# Patient Record
Sex: MALE | Race: WHITE | NOT HISPANIC OR LATINO | Employment: FULL TIME | URBAN - NONMETROPOLITAN AREA
[De-identification: names, ages, dates, MRNs, and addresses within clinical notes are randomized per-mention and may not be internally consistent; named-entity substitution may affect disease eponyms.]

---

## 2017-01-06 ENCOUNTER — HOSPITAL ENCOUNTER (INPATIENT)
Facility: HOSPITAL | Age: 22
LOS: 3 days | Discharge: HOME | End: 2017-01-09

## 2017-01-06 ENCOUNTER — HOSPITAL ENCOUNTER (EMERGENCY)
Facility: HOSPITAL | Age: 22
Discharge: PSYCHIATRIC OR SUBSTANCE USE FACILITY | End: 2017-01-06
Attending: EMERGENCY MEDICINE | Admitting: EMERGENCY MEDICINE

## 2017-01-06 VITALS
OXYGEN SATURATION: 99 % | DIASTOLIC BLOOD PRESSURE: 82 MMHG | HEIGHT: 68 IN | BODY MASS INDEX: 20.46 KG/M2 | RESPIRATION RATE: 16 BRPM | TEMPERATURE: 98.3 F | WEIGHT: 135 LBS | SYSTOLIC BLOOD PRESSURE: 146 MMHG | HEART RATE: 71 BPM

## 2017-01-06 DIAGNOSIS — F32.2 MDD (MAJOR DEPRESSIVE EPISODE), SINGLE EPISODE, SEVERE, NO PSYCHOSIS (HCC): Primary | ICD-10-CM

## 2017-01-06 DIAGNOSIS — R45.851 DEPRESSION WITH SUICIDAL IDEATION: Primary | ICD-10-CM

## 2017-01-06 DIAGNOSIS — F32.A DEPRESSION WITH SUICIDAL IDEATION: Primary | ICD-10-CM

## 2017-01-06 LAB
ALBUMIN SERPL-MCNC: 4.9 G/DL (ref 3.5–5)
ALBUMIN/GLOB SERPL: 1.6 G/DL (ref 1.5–2.5)
ALP SERPL-CCNC: 72 U/L (ref 46–116)
ALT SERPL W P-5'-P-CCNC: 21 U/L (ref 10–44)
AMPHET+METHAMPHET UR QL: NEGATIVE
ANION GAP SERPL CALCULATED.3IONS-SCNC: 4.3 MMOL/L (ref 3.6–11.2)
AST SERPL-CCNC: 28 U/L (ref 10–34)
BARBITURATES UR QL SCN: NEGATIVE
BASOPHILS # BLD AUTO: 0.04 10*3/MM3 (ref 0–0.3)
BASOPHILS NFR BLD AUTO: 0.8 % (ref 0–2)
BENZODIAZ UR QL SCN: NEGATIVE
BILIRUB SERPL-MCNC: 0.5 MG/DL (ref 0.2–1.8)
BILIRUB UR QL STRIP: NEGATIVE
BUN BLD-MCNC: 13 MG/DL (ref 7–21)
BUN/CREAT SERPL: 16 (ref 7–25)
BUPRENORPHINE+NOR UR QL SCN: NEGATIVE
CALCIUM SPEC-SCNC: 9.7 MG/DL (ref 7.7–10)
CANNABINOIDS SERPL QL: POSITIVE
CHLORIDE SERPL-SCNC: 103 MMOL/L (ref 99–112)
CLARITY UR: CLEAR
CO2 SERPL-SCNC: 30.7 MMOL/L (ref 24.3–31.9)
COCAINE UR QL: NEGATIVE
COLOR UR: YELLOW
CREAT BLD-MCNC: 0.81 MG/DL (ref 0.43–1.29)
DEPRECATED RDW RBC AUTO: 39.9 FL (ref 37–54)
EOSINOPHIL # BLD AUTO: 0.21 10*3/MM3 (ref 0–0.7)
EOSINOPHIL NFR BLD AUTO: 4 % (ref 0–5)
ERYTHROCYTE [DISTWIDTH] IN BLOOD BY AUTOMATED COUNT: 12.5 % (ref 11.5–14.5)
ETHANOL BLD-MCNC: <10 MG/DL
ETHANOL UR QL: <0.01 %
GFR SERPL CREATININE-BSD FRML MDRD: 118 ML/MIN/1.73
GLOBULIN UR ELPH-MCNC: 3 GM/DL
GLUCOSE BLD-MCNC: 96 MG/DL (ref 70–110)
GLUCOSE UR STRIP-MCNC: NEGATIVE MG/DL
HCT VFR BLD AUTO: 42.3 % (ref 42–52)
HGB BLD-MCNC: 14.6 G/DL (ref 14–18)
HGB UR QL STRIP.AUTO: NEGATIVE
IMM GRANULOCYTES # BLD: 0.01 10*3/MM3 (ref 0–0.03)
IMM GRANULOCYTES NFR BLD: 0.2 % (ref 0–0.5)
KETONES UR QL STRIP: NEGATIVE
LEUKOCYTE ESTERASE UR QL STRIP.AUTO: NEGATIVE
LYMPHOCYTES # BLD AUTO: 1.54 10*3/MM3 (ref 1–3)
LYMPHOCYTES NFR BLD AUTO: 29 % (ref 21–51)
MCH RBC QN AUTO: 30 PG (ref 27–33)
MCHC RBC AUTO-ENTMCNC: 34.5 G/DL (ref 33–37)
MCV RBC AUTO: 86.9 FL (ref 80–94)
METHADONE UR QL SCN: NEGATIVE
MONOCYTES # BLD AUTO: 0.56 10*3/MM3 (ref 0.1–0.9)
MONOCYTES NFR BLD AUTO: 10.5 % (ref 0–10)
NEUTROPHILS # BLD AUTO: 2.95 10*3/MM3 (ref 1.4–6.5)
NEUTROPHILS NFR BLD AUTO: 55.5 % (ref 30–70)
NITRITE UR QL STRIP: NEGATIVE
OPIATES UR QL: NEGATIVE
OSMOLALITY SERPL CALC.SUM OF ELEC: 275.7 MOSM/KG (ref 273–305)
OXYCODONE UR QL SCN: NEGATIVE
PCP UR QL SCN: NEGATIVE
PH UR STRIP.AUTO: 7 [PH] (ref 5–8)
PLATELET # BLD AUTO: 246 10*3/MM3 (ref 130–400)
PMV BLD AUTO: 10.5 FL (ref 6–10)
POTASSIUM BLD-SCNC: 3.7 MMOL/L (ref 3.5–5.3)
PROPOXYPH UR QL: NEGATIVE
PROT SERPL-MCNC: 7.9 G/DL (ref 6–8)
PROT UR QL STRIP: NEGATIVE
RBC # BLD AUTO: 4.87 10*6/MM3 (ref 4.7–6.1)
SODIUM BLD-SCNC: 138 MMOL/L (ref 135–153)
SP GR UR STRIP: 1.02 (ref 1–1.03)
UROBILINOGEN UR QL STRIP: NORMAL
WBC NRBC COR # BLD: 5.31 10*3/MM3 (ref 4.5–12.5)

## 2017-01-06 RX ORDER — IBUPROFEN 400 MG/1
400 TABLET ORAL EVERY 6 HOURS PRN
Status: DISCONTINUED | OUTPATIENT
Start: 2017-01-06 | End: 2017-01-09 | Stop reason: HOSPADM

## 2017-01-06 RX ORDER — MAGNESIUM HYDROXIDE/ALUMINUM HYDROXICE/SIMETHICONE 120; 1200; 1200 MG/30ML; MG/30ML; MG/30ML
30 SUSPENSION ORAL EVERY 6 HOURS PRN
Status: DISCONTINUED | OUTPATIENT
Start: 2017-01-06 | End: 2017-01-09 | Stop reason: HOSPADM

## 2017-01-06 RX ORDER — ONDANSETRON 4 MG/1
4 TABLET, FILM COATED ORAL EVERY 6 HOURS PRN
Status: DISCONTINUED | OUTPATIENT
Start: 2017-01-06 | End: 2017-01-09 | Stop reason: HOSPADM

## 2017-01-06 RX ORDER — NICOTINE 21 MG/24HR
1 PATCH, TRANSDERMAL 24 HOURS TRANSDERMAL EVERY 24 HOURS
Status: DISCONTINUED | OUTPATIENT
Start: 2017-01-06 | End: 2017-01-06 | Stop reason: ALTCHOICE

## 2017-01-06 RX ORDER — ECHINACEA PURPUREA EXTRACT 125 MG
2 TABLET ORAL AS NEEDED
Status: DISCONTINUED | OUTPATIENT
Start: 2017-01-06 | End: 2017-01-09 | Stop reason: HOSPADM

## 2017-01-06 RX ORDER — BENZONATATE 100 MG/1
100 CAPSULE ORAL 3 TIMES DAILY PRN
Status: DISCONTINUED | OUTPATIENT
Start: 2017-01-06 | End: 2017-01-09 | Stop reason: HOSPADM

## 2017-01-06 RX ORDER — NICOTINE 21 MG/24HR
1 PATCH, TRANSDERMAL 24 HOURS TRANSDERMAL DAILY
Status: DISCONTINUED | OUTPATIENT
Start: 2017-01-06 | End: 2017-01-09 | Stop reason: HOSPADM

## 2017-01-06 RX ORDER — HYDROXYZINE 50 MG/1
50 TABLET, FILM COATED ORAL EVERY 6 HOURS PRN
Status: DISCONTINUED | OUTPATIENT
Start: 2017-01-06 | End: 2017-01-09 | Stop reason: HOSPADM

## 2017-01-06 RX ORDER — TRAZODONE HYDROCHLORIDE 50 MG/1
50 TABLET ORAL NIGHTLY PRN
Status: DISCONTINUED | OUTPATIENT
Start: 2017-01-06 | End: 2017-01-09 | Stop reason: HOSPADM

## 2017-01-06 RX ADMIN — NICOTINE 1 PATCH: 21 PATCH TRANSDERMAL at 18:21

## 2017-01-06 NOTE — DISCHARGE INSTRUCTIONS

## 2017-01-06 NOTE — NURSING NOTE
Intake assessment complete. Pt reports suicidal thoughts for the past 4 years, but getting worse progressively recently after the birth of his two children. States that the lack of sleep he is getting is primary stressor. Unable to disclose specific plan but states that he has thought of a million different ways to kill his self the past few days. States that two days ago he picked up knife and held it to head wanting to kill self, but could not go through with it. Denies HI, psychosis. Will continue to monitor.

## 2017-01-06 NOTE — IP AVS SNAPSHOT
AFTER VISIT SUMMARY             Antony Salgado           About your hospitalization     You were admitted on:  January 6, 2017 You last received care in the:  [facility]ULT [facility]       Procedures & Surgeries         Medications    If you or your caregiver advised us that you are currently taking a medication and that medication is marked below as “Resume”, this simply indicates that we have reviewed those medications to make sure our new therapy recommendations do not interfere.  If you have concerns about medications other than those new ones which we are prescribing today, please consult the physician who prescribed them (or your primary physician).  Our review of your home medications is not meant to indicate that we are directing their use.             Your Medications      START taking these medications     sertraline 50 MG tablet   Take 1 tablet by mouth Every Night.   Last time this was given:  1/8/2017 11:01 AM   Commonly known as:  ZOLOFT           traZODone 50 MG tablet   Take 1 tablet by mouth At Night As Needed for sleep (give between 9 pm and 2 am).   Last time this was given:  1/8/2017 10:27 PM   Commonly known as:  DESYREL                Where to Get Your Medications      These medications were sent to [place] [address] - [phone] [facility] [phone] [address] [address]     Phone:  [phone]     sertraline 50 MG tablet    traZODone 50 MG tablet                  Your Medications      Your Medication List           Morning Noon Evening Bedtime As Needed    sertraline 50 MG tablet   Take 1 tablet by mouth Every Night.   Commonly known as:  ZOLOFT                                   traZODone 50 MG tablet   Take 1 tablet by mouth At Night As Needed for sleep (give between 9 pm and 2 am).   Commonly known as:  DESYREL                            50MG AT NIGHT AS NEEDED FOR INSOMNIA                Instructions for After  Discharge        Activity Instructions     Activity as Tolerated                 Diet Instructions     Advance Diet As Tolerated                 Discharge References/Attachments     SERTRALINE TABLETS (ENGLISH)    TRAZODONE TABLETS (ENGLISH)    SOCIAL ANXIETY DISORDER (ENGLISH)    DEPRESSION, ADULT (ENGLISH)    SUICIDAL FEELINGS, HOW TO HELP YOURSELF (ENGLISH)       Follow-ups for After Discharge        Scheduled Appointments     Jan 23, 2017  2:20 PM EST   Medicine Check with FAVIOLA Garcia   [facility] (--)    [address] [place], [place] [address]   [phone]            Additional instructions:      CRLAURENT [place]  [address]  [place] [id number]  [phone]    January 13 2017 at 9:15am with Uzma Armstrong Signup     Our records indicate that you have declined [facility] [employer] signup. If you would like to sign up for [employer], please email [email or web address] or call [phone] to obtain an activation code.         Summary of Your Hospitalization        Reason for Hospitalization     Your primary diagnosis was:  Mood Problem      Care Providers     Provider Service Role Specialty    Toan Workman MD Psychiatry Attending Provider Psychiatry      Your Allergies  Date Reviewed: 1/6/2017    No active allergies      Patient Belongings Returned     Document Return of Belongings Flowsheet     Were the patient bedside belongings sent home?   Yes   Belongings Retrieved from Security & Sent Home   N/A    Belongings Sent to Safe   --   Medications Retrieved from Pharmacy & Sent Home   N/A              More Information      Sertraline tablets  What is this medicine?  SERTRALINE (SER tra cosme) is used to treat depression. It may also be used to treat obsessive compulsive disorder, panic disorder, post-trauma stress, premenstrual dysphoric disorder (PMDD) or social anxiety.  This medicine may be used for other purposes; ask your health care  provider or pharmacist if you have questions.  What should I tell my health care provider before I take this medicine?  They need to know if you have any of these conditions:  -bipolar disorder or a family history of bipolar disorder  -diabetes  -glaucoma  -heart disease  -high blood pressure  -history of irregular heartbeat  -history of low levels of calcium, magnesium, or potassium in the blood  -if you often drink alcohol  -liver disease  -receiving electroconvulsive therapy  -seizures  -suicidal thoughts, plans, or attempt; a previous suicide attempt by you or a family member  -thyroid disease  -an unusual or allergic reaction to sertraline, other medicines, foods, dyes, or preservatives  -pregnant or trying to get pregnant  -breast-feeding  How should I use this medicine?  Take this medicine by mouth with a glass of water. Follow the directions on the prescription label. You can take it with or without food. Take your medicine at regular intervals. Do not take your medicine more often than directed. Do not stop taking this medicine suddenly except upon the advice of your doctor. Stopping this medicine too quickly may cause serious side effects or your condition may worsen.  A special MedGuide will be given to you by the pharmacist with each prescription and refill. Be sure to read this information carefully each time.  Talk to your pediatrician regarding the use of this medicine in children. While this drug may be prescribed for children as young as 7 years for selected conditions, precautions do apply.  Overdosage: If you think you have taken too much of this medicine contact a poison control center or emergency room at once.  NOTE: This medicine is only for you. Do not share this medicine with others.  What if I miss a dose?  If you miss a dose, take it as soon as you can. If it is almost time for your next dose, take only that dose. Do not take double or extra doses.  What may interact with this  medicine?  Do not take this medicine with any of the following medications:  -certain medicines for fungal infections like fluconazole, itraconazole, ketoconazole, posaconazole, voriconazole  -cisapride  -disulfiram  -dofetilide  -linezolid  -MAOIs like Carbex, Eldepryl, Marplan, Nardil, and Parnate  -metronidazole  -methylene blue (injected into a vein)  -pimozide  -thioridazine  -ziprasidone  This medicine may also interact with the following medications:  -alcohol  -aspirin and aspirin-like medicines  -certain medicines for depression, anxiety, or psychotic disturbances  -certain medicines for irregular heart beat like flecainide, propafenone  -certain medicines for migraine headaches like almotriptan, eletriptan, frovatriptan, naratriptan, rizatriptan, sumatriptan, zolmitriptan  -certain medicines for sleep  -certain medicines for seizures like carbamazepine, valproic acid, phenytoin  -certain medicines that treat or prevent blood clots like warfarin, enoxaparin, dalteparin  -cimetidine  -digoxin  -diuretics  -fentanyl  -furazolidone  -isoniazid  -lithium  -NSAIDs, medicines for pain and inflammation, like ibuprofen or naproxen  -other medicines that prolong the QT interval (cause an abnormal heart rhythm)  -procarbazine  -rasagiline  -supplements like [place]'s wort, kava kava, valerian  -tolbutamide  -tramadol  -tryptophan  This list may not describe all possible interactions. Give your health care provider a list of all the medicines, herbs, non-prescription drugs, or dietary supplements you use. Also tell them if you smoke, drink alcohol, or use illegal drugs. Some items may interact with your medicine.  What should I watch for while using this medicine?  Tell your doctor if your symptoms do not get better or if they get worse. Visit your doctor or health care professional for regular checks on your progress. Because it may take several weeks to see the full effects of this medicine, it is important to  continue your treatment as prescribed by your doctor.  Patients and their families should watch out for new or worsening thoughts of suicide or depression. Also watch out for sudden changes in feelings such as feeling anxious, agitated, panicky, irritable, hostile, aggressive, impulsive, severely restless, overly excited and hyperactive, or not being able to sleep. If this happens, especially at the beginning of treatment or after a change in dose, call your health care professional.  You may get drowsy or dizzy. Do not drive, use machinery, or do anything that needs mental alertness until you know how this medicine affects you. Do not stand or sit up quickly, especially if you are an older patient. This reduces the risk of dizzy or fainting spells. Alcohol may interfere with the effect of this medicine. Avoid alcoholic drinks.  Your mouth may get dry. Chewing sugarless gum or sucking hard candy, and drinking plenty of water may help. Contact your doctor if the problem does not go away or is severe.  What side effects may I notice from receiving this medicine?  Side effects that you should report to your doctor or health care professional as soon as possible:  -allergic reactions like skin rash, itching or hives, swelling of the face, lips, or tongue  -black or bloody stools, blood in the urine or vomit  -fast, irregular heartbeat  -feeling faint or lightheaded, falls  -hallucination, loss of contact with reality  -seizures  -suicidal thoughts or other mood changes  -unusual bleeding or bruising  -unusually weak or tired  -vomiting  Side effects that usually do not require medical attention (report to your doctor or health care professional if they continue or are bothersome):  -change in appetite  -change in sex drive or performance  -diarrhea  -increased sweating  -indigestion, nausea  -tremors  This list may not describe all possible side effects. Call your doctor for medical advice about side effects. You may  report side effects to FDA at 3-983-FDA-4269.  Where should I keep my medicine?  Keep out of the reach of children.  Store at room temperature between 15 and 30 degrees C (59 and 86 degrees F). Throw away any unused medicine after the expiration date.  NOTE: This sheet is a summary. It may not cover all possible information. If you have questions about this medicine, talk to your doctor, pharmacist, or health care provider.     © 2016, Elsevier/[employer] Standard. (2014-07-15 12:57:35)          Trazodone tablets  What is this medicine?  TRAZODONE (TRAZ oh done) is used to treat depression.  This medicine may be used for other purposes; ask your health care provider or pharmacist if you have questions.  What should I tell my health care provider before I take this medicine?  They need to know if you have any of these conditions:  -attempted suicide or thinking about it  -bipolar disorder  -bleeding problems  -glaucoma  -heart disease, or previous heart attack  -irregular heart beat  -kidney or liver disease  -low levels of sodium in the blood  -an unusual or allergic reaction to trazodone, other medicines, foods, dyes or preservatives  -pregnant or trying to get pregnant  -breast-feeding  How should I use this medicine?  Take this medicine by mouth with a glass of water. Follow the directions on the prescription label. Take this medicine shortly after a meal or a light snack. Take your medicine at regular intervals. Do not take your medicine more often than directed. Do not stop taking this medicine suddenly except upon the advice of your doctor. Stopping this medicine too quickly may cause serious side effects or your condition may worsen.  A special MedGuide will be given to you by the pharmacist with each prescription and refill. Be sure to read this information carefully each time.  Talk to your pediatrician regarding the use of this medicine in children. Special care may be needed.  Overdosage: If you think you have  taken too much of this medicine contact a poison control center or emergency room at once.  NOTE: This medicine is only for you. Do not share this medicine with others.  What if I miss a dose?  If you miss a dose, take it as soon as you can. If it is almost time for your next dose, take only that dose. Do not take double or extra doses.  What may interact with this medicine?  Do not take this medicine with any of the following medications:  -certain medicines for fungal infections like fluconazole, itraconazole, ketoconazole, posaconazole, voriconazole  -cisapride  -dofetilide  -dronedarone  -linezolid  -MAOIs like Carbex, Eldepryl, Marplan, Nardil, and Parnate  -mesoridazine  -methylene blue (injected into a vein)  -pimozide  -saquinavir  -thioridazine  -ziprasidone  This medicine may also interact with the following medications:  -alcohol  -antiviral medicines for HIV or AIDS  -aspirin and aspirin-like medicines  -barbiturates like phenobarbital  -certain medicines for blood pressure, heart disease, irregular heart beat  -certain medicines for depression, anxiety, or psychotic disturbances  -certain medicines for migraine headache like almotriptan, eletriptan, frovatriptan, naratriptan, rizatriptan, sumatriptan, zolmitriptan  -certain medicines for seizures like carbamazepine and phenytoin  -certain medicines for sleep  -certain medicines that treat or prevent blood clots like dalteparin, enoxaparin, warfarin  -digoxin  -fentanyl  -lithium  -NSAIDS, medicines for pain and inflammation, like ibuprofen or naproxen  -other medicines that prolong the QT interval (cause an abnormal heart rhythm)  -rasagiline  -supplements like [place]'s wort, kava kava, valerian  -tramadol  -tryptophan  This list may not describe all possible interactions. Give your health care provider a list of all the medicines, herbs, non-prescription drugs, or dietary supplements you use. Also tell them if you smoke, drink alcohol, or use illegal  drugs. Some items may interact with your medicine.  What should I watch for while using this medicine?  Tell your doctor if your symptoms do not get better or if they get worse. Visit your doctor or health care professional for regular checks on your progress. Because it may take several weeks to see the full effects of this medicine, it is important to continue your treatment as prescribed by your doctor.  Patients and their families should watch out for new or worsening thoughts of suicide or depression. Also watch out for sudden changes in feelings such as feeling anxious, agitated, panicky, irritable, hostile, aggressive, impulsive, severely restless, overly excited and hyperactive, or not being able to sleep. If this happens, especially at the beginning of treatment or after a change in dose, call your health care professional.  You may get drowsy or dizzy. Do not drive, use machinery, or do anything that needs mental alertness until you know how this medicine affects you. Do not stand or sit up quickly, especially if you are an older patient. This reduces the risk of dizzy or fainting spells. Alcohol may interfere with the effect of this medicine. Avoid alcoholic drinks.  This medicine may cause dry eyes and blurred vision. If you wear contact lenses you may feel some discomfort. Lubricating drops may help. See your eye doctor if the problem does not go away or is severe.  Your mouth may get dry. Chewing sugarless gum, sucking hard candy and drinking plenty of water may help. Contact your doctor if the problem does not go away or is severe.  What side effects may I notice from receiving this medicine?  Side effects that you should report to your doctor or health care professional as soon as possible:  -allergic reactions like skin rash, itching or hives, swelling of the face, lips, or tongue  -fast, irregular heartbeat  -feeling faint or lightheaded, falls  -painful erections or other sexual  dysfunction  -suicidal thoughts or other mood changes  -trembling  Side effects that usually do not require medical attention (report to your doctor or health care professional if they continue or are bothersome):  -constipation  -headache  -muscle aches or pains  -nausea, vomiting  -unusually weak or tired  This list may not describe all possible side effects. Call your doctor for medical advice about side effects. You may report side effects to FDA at 1-656-HOA-4629.  Where should I keep my medicine?  Keep out of the reach of children.  Store at room temperature between 15 and 30 degrees C (59 to 86 degrees F). Protect from light. Keep container tightly closed. Throw away any unused medicine after the expiration date.  NOTE: This sheet is a summary. It may not cover all possible information. If you have questions about this medicine, talk to your doctor, pharmacist, or health care provider.     © 2016, Elsevier/Gold Standard. (2014-07-21 15:46:28)          Social Anxiety Disorder  Social anxiety disorder, previously called social phobia, is a mental disorder. People with social anxiety disorder frequently feel nervous, afraid, or embarrassed when around other people in social situations. They constantly worry that other people are judging or criticizing them for how they look, what they say, or how they act. They may worry that other people might reject them because of their appearance or behavior.  Social anxiety disorder is more than just occasional shyness or self-consciousness. It can cause severe emotional distress. It can interfere with daily life activities. Social anxiety disorder also may lead to excessive alcohol or drug use and even suicide.   Social anxiety disorder is actually one of the most common mental disorders. It can develop at any time but usually starts in the teenage years. Women are more commonly affected than men. Social anxiety disorder is also more common in people who have family  members with anxiety disorders. It also is more common in people who have physical deformities or conditions with characteristics that are obvious to others, such as stuttered speech or movement abnormalities (Parkinson disease).   SYMPTOMS   In addition to feeling anxious or fearful in social situations, people with social anxiety disorder frequently have physical symptoms. Examples include:  · Red face (blushing).  · Racing heart.  · Sweating.  · Shaky hands or voice.  · Confusion.  · Light-headedness.  · Upset stomach and diarrhea.  DIAGNOSIS   Social anxiety disorder is diagnosed through an assessment by your health care provider. Your health care provider will ask you questions about your mood, thoughts, and reactions in social situations. Your health care provider may ask you about your medical history and use of alcohol or drugs, including prescription medicines. Certain medical conditions and the use of certain substances, including caffeine, can cause symptoms similar to social anxiety disorder. Your health care provider may refer you to a mental health specialist for further evaluation or treatment.  The criteria for diagnosis of social anxiety disorder are:  · Marked fear or anxiety in one or more social situations in which you may be closely watched or studied by others. Examples of such situations include:    Interacting socially (having a conversation with others, going to a party, or meeting strangers).    Being observed (eating or drinking in public or being called on in class).    Performing in front of others (giving a speech).  · The social situations of concern almost always cause fear or anxiety, not just occasionally.  · People with social anxiety disorder fear that they will be viewed negatively in a way that will be embarrassing, will lead to rejection, or will offend others. This fear is out of proportion to the actual threat posed by the social situation.  · Often the triggering social  situations are avoided, or they are endured with intense fear or anxiety. The fear, anxiety, or avoidance is persistent and lasts for 6 months or longer.  · The anxiety causes difficulty functioning in at least some parts of your daily life.  TREATMENT   Several types of treatment are available for social anxiety disorder. These treatments are often used in combination and include:   · Talk therapy. Group talk therapy allows you to see that you are not alone with these problems. Individual talk therapy helps you address your specific anxiety issues with a caring professional. The most effective forms of talk therapy for social anxiety disorder are cognitive-behavioral therapy and exposure therapy. Cognitive-behavioral therapy helps you to identify and change negative thoughts and beliefs that are at the root of the disorder. Exposure therapy allows you to gradually face the situations that you fear most.  · Relaxation and coping techniques. These include deep breathing, self-talk, meditation, visual imagery, and yoga. Relaxation techniques help to keep you calm in social situations.  · Social skills training. Social skills can be learned on your own or with the help of a talk therapist. They can help you feel more confident and comfortable in social situations.  · Medicine. For anxiety limited to performance situations (performance anxiety), medicine called beta blockers can help by reducing or preventing the physical symptoms of social anxiety disorder. For more persistent and generalized social anxiety, antidepressant medicine may be prescribed to help control symptoms. In severe cases of social anxiety disorder, strong antianxiety medicine, called benzodiazepines, may be prescribed on a limited basis and for a short time.     This information is not intended to replace advice given to you by your health care provider. Make sure you discuss any questions you have with your health care provider.     Document  Released: 11/16/2006 Document Revised: 01/08/2016 Document Reviewed: 03/18/2014  [employer] Patient Education ©2016 [employer]          Depression, Adult  Depression refers to feeling sad, low, down in the dumps, blue, gloomy, or empty. In general, there are two kinds of depression:  1. Normal sadness or normal grief. This kind of depression is one that we all feel from time to time after upsetting life experiences, such as the loss of a job or the ending of a relationship. This kind of depression is considered normal, is short lived, and resolves within a few days to 2 weeks. Depression experienced after the loss of a loved one (bereavement) often lasts longer than 2 weeks but normally gets better with time.  2. Clinical depression. This kind of depression lasts longer than normal sadness or normal grief or interferes with your ability to function at home, at work, and in school. It also interferes with your personal relationships. It affects almost every aspect of your life. Clinical depression is an illness.  Symptoms of depression can also be caused by conditions other than those mentioned above, such as:  · Physical illness. Some physical illnesses, including underactive thyroid gland (hypothyroidism), severe anemia, specific types of cancer, diabetes, uncontrolled seizures, heart and lung problems, strokes, and chronic pain are commonly associated with symptoms of depression.  · Side effects of some prescription medicine. In some people, certain types of medicine can cause symptoms of depression.  · Substance abuse. Abuse of alcohol and illicit drugs can cause symptoms of depression.  SYMPTOMS  Symptoms of normal sadness and normal grief include the following:  · Feeling sad or crying for short periods of time.  · Not caring about anything (apathy).  · Difficulty sleeping or sleeping too much.  · No longer able to enjoy the things you used to enjoy.  · Desire to be by oneself all the time (social  isolation).  · Lack of energy or motivation.  · Difficulty concentrating or remembering.  · Change in appetite or weight.  · Restlessness or agitation.  Symptoms of clinical depression include the same symptoms of normal sadness or normal grief and also the following symptoms:  · Feeling sad or crying all the time.  · Feelings of guilt or worthlessness.  · Feelings of hopelessness or helplessness.  · Thoughts of suicide or the desire to harm yourself (suicidal ideation).  · Loss of touch with reality (psychotic symptoms). Seeing or hearing things that are not real (hallucinations) or having false beliefs about your life or the people around you (delusions and paranoia).  DIAGNOSIS   The diagnosis of clinical depression is usually based on how bad the symptoms are and how long they have lasted. Your health care provider will also ask you questions about your medical history and substance use to find out if physical illness, use of prescription medicine, or substance abuse is causing your depression. Your health care provider may also order blood tests.  TREATMENT   Often, normal sadness and normal grief do not require treatment. However, sometimes antidepressant medicine is given for bereavement to ease the depressive symptoms until they resolve.  The treatment for clinical depression depends on how bad the symptoms are but often includes antidepressant medicine, counseling with a mental health professional, or both. Your health care provider will help to determine what treatment is best for you.  Depression caused by physical illness usually goes away with appropriate medical treatment of the illness. If prescription medicine is causing depression, talk with your health care provider about stopping the medicine, decreasing the dose, or changing to another medicine.  Depression caused by the abuse of alcohol or illicit drugs goes away when you stop using these substances. Some adults need professional help in order  to stop drinking or using drugs.  SEEK IMMEDIATE MEDICAL CARE IF:  · You have thoughts about hurting yourself or others.  · You lose touch with reality (have psychotic symptoms).  · You are taking medicine for depression and have a serious side effect.  FOR MORE INFORMATION  · National [place] on Mental Illness: www.andreina.org   · National [place] of Mental Health: www.nimh.nih.gov      This information is not intended to replace advice given to you by your health care provider. Make sure you discuss any questions you have with your health care provider.     Document Released: 12/15/2001 Document Revised: 01/08/2016 Document Reviewed: 03/18/2013  [employer] Interactive Patient Education ©2016 [employer]          Suicidal Feelings: How to Help Yourself  Suicide is the taking of one's own life. If you feel as though life is getting too tough to handle and are thinking about suicide, get help right away. To get help:  · Call your local emergency services (911 in the [place]  · Call a suicide hotline to speak with a trained counselor who understands how you are feeling. The following is a list of suicide hotlines in the [place]. For a list of hotlines in [place], visit www.suicide.org/hotlines/international/zsmxrh-zzuihaz-qpelqpif.html.     7-262-898-TALK (1-867.669.5176).     3-440-OLPLUUU (1-830.112.7140).     1-440.262.1931. This is a hotline for Latvian speakers.     2-148-333-4TTY (1-777.794.4876). This is a hotline for TTY users.     6-402-4-U-ELOY (1-290.600.8981). This is a hotline for lesbian, shannon, bisexual, transgender, or questioning youth.  · Contact a crisis center or a local suicide prevention center. To find a crisis center or suicide prevention center:    Call your local hospital, clinic, community service organization, mental health center, social service provider, or health department. Ask for assistance in connecting to a crisis center.    Visit  www.suicidepreventionlifeline.org/getinvolved/ for a list of crisis centers in the [place], or visit www.suicideprevention.ca/tebuxqkt-iqlbw-sntpflg/find-a-crisis-centre for a list of centers in [place].  · Visit the following websites:     National Suicide Prevention Lifeline: www.suicidepreventionlifeline.org     Hopeline: www.hopeline.Wishdates     American Foundation for Suicide Prevention: www.afsp.org     The Vaughn Project (for lesbian, shannon, bisexual, transgender, or questioning youth): www.thetrevorproject.org  HOW CAN I HELP MYSELF FEEL BETTER?  · Promise yourself that you will not do anything drastic when you have suicidal feelings. Remember, there is hope. Many people have gotten through suicidal thoughts and feelings, and you will, too. You may have gotten through them before, and this proves that you can get through them again.  · Let family, friends, teachers, or counselors know how you are feeling. Try not to isolate yourself from those who care about you. Remember, they will want to help you. Talk with someone every day, even if you do not feel sociable. Face-to-face conversation is best.  · Call a mental health professional and see one regularly.  · Visit your primary health care provider every year.  · Eat a well-balanced diet, and space your meals so you eat regularly.  · Get plenty of rest.  · Avoid alcohol and drugs, and remove them from your home. They will only make you feel worse.  · If you are thinking of taking a lot of medicine, give your medicine to someone who can give it to you one day at a time. If you are on antidepressants and are concerned you will overdose, let your health care provider know so he or she can give you safer medicines. Ask your mental health professional about the possible side effects of any medicines you are taking.  · Remove weapons, poisons, knives, and anything else that could harm you from your home.  · Try to stick to routines. Follow a schedule every  day. Put self-care on your schedule.  · Make a list of realistic goals, and cross them off when you achieve them. Accomplishments give a sense of worth.  · Wait until you are feeling better before doing the things you find difficult or unpleasant.  · Exercise if you are able. You will feel better if you exercise for even a half hour each day.  · Go out in the sun or into nature. This will help you recover from depression faster. If you have a favorite place to walk, go there.  · Do the things that have always given you pleasure. Play your favorite music, read a good book, paint a picture, play your favorite instrument, or do anything else that takes your mind off your depression if it is safe to do.  · Keep your living space well lit.  · When you are feeling well, write yourself a letter about tips and support that you can read when you are not feeling well.  · Remember that life's difficulties can be sorted out with help. Conditions can be treated. You can work on thoughts and strategies that serve you well.     This information is not intended to replace advice given to you by your health care provider. Make sure you discuss any questions you have with your health care provider.     Document Released: 06/23/2004 Document Revised: 01/08/2016 Document Reviewed: 04/14/2015  [employer] Interactive Patient Education ©2016 [employer]            SYMPTOMS OF A STROKE    Call 911 or have someone take you to the Emergency Department if you have any of the following:    · Sudden numbness or weakness of your face, arm or leg especially on one side of the body  · Sudden confusion, diffiiculty speaking or trouble understanding   · Changes in your vision or loss of sight in one eye  · Sudden severe headache with no known cause  · sudden dizziness, trouble walking, loss of balance or coordination    It is important to seek emergency care right away if you have further stroke symptoms. If you get emergency help quickly, the  powerful clot-dissolving medicines can reduce the disabilities caused by a stroke.     For more information:    American Stroke Association  1-647-8-STROKE  www.strokeassociation.org           IF YOU SMOKE OR USE TOBACCO PLEASE READ THE FOLLOWING:    Why is smoking bad for me?  Smoking increases the risk of heart disease, lung disease, vascular disease, stroke, and cancer.     If you smoke, STOP!    If you would like more information on quitting smoking, please visit the [employer] website: www.CampusTap/Starlineate/healthier-together/smoke   This link will provide additional resources including the QUIT line and the Beat the Pack support groups.     For more information:    American Cancer Society  (588) 619-1854    American Heart Association  1-868.609.3149               YOU ARE THE MOST IMPORTANT FACTOR IN YOUR RECOVERY.     Follow all instructions carefully.     I have reviewed my discharge instructions with my nurse, including the following information, if applicable:     Information about my illness and diagnosis   Follow up appointments (including lab draws)   Wound Care   Equipment Needs   Medications (new and continuing) along with side effects   Preventative information such as vaccines and smoking cessations   Diet   Pain   I know when to contact my Doctor's office or seek emergency care      I want my nurse to describe the side effects of my medications: YES NO   If the answer is no, I understand the side effects of my medications: YES NO   My nurse described the side effects of my medications in a way that I could understand: YES NO   I have taken my personal belongings and my own medications with me at discharge: YES NO            I have received this information and my questions have been answered. I have discussed any concerns I see with this plan with the nurse or physician. I understand these instructions.    Signature of Patient or Responsible Person:  _____________________________________    Date: _________________  Time: __________________    Signature of Healthcare Provider: _______________________________________  Date: _________________  Time: __________________

## 2017-01-06 NOTE — NURSING NOTE
Pt searched per protocol and placed into hospital gowns. Personal items collected and placed in inventory bag. Sheet logged .

## 2017-01-07 PROBLEM — F32.2 MDD (MAJOR DEPRESSIVE EPISODE), SINGLE EPISODE, SEVERE, NO PSYCHOSIS (HCC): Status: ACTIVE | Noted: 2017-01-06

## 2017-01-07 PROCEDURE — 99223 1ST HOSP IP/OBS HIGH 75: CPT

## 2017-01-07 RX ORDER — SERTRALINE HYDROCHLORIDE 25 MG/1
25 TABLET, FILM COATED ORAL DAILY
Status: COMPLETED | OUTPATIENT
Start: 2017-01-07 | End: 2017-01-07

## 2017-01-07 RX ADMIN — SERTRALINE 25 MG: 25 TABLET, FILM COATED ORAL at 14:18

## 2017-01-07 RX ADMIN — TRAZODONE HYDROCHLORIDE 50 MG: 50 TABLET ORAL at 23:15

## 2017-01-07 NOTE — PLAN OF CARE
Problem:  Patient Care Overview (Adult)  Goal: Individualization and Mutuality  Outcome: Ongoing (interventions implemented as appropriate)    01/06/17 1640 01/07/17 1110   Behavioral Health Screens   Patient Personal Strengths --  expressive of emotions;expressive of needs;motivated for recovery;motivated for treatment;spiritual/Zoroastrian support;self-awareness;resourceful;stable living environment   Patient Personal Strengths Comment --  Pt is willing to seek help   Patient Vulnerabilities --  depression with suicidal thoughts   Individualization   Patient Specific Preferences --  none   Patient Specific Goals --  to get better   Patient Specific Interventions --  Individual and group therapy to focus on healthy coping   Mutuality/Individual Preferences   What Anxieties, Fears or Concerns Do You Have About Your Health or Care? anxiety about treatment --    What Questions Do You Have About Your Health or Care? no questions- reviewed with pt routine orders and med, visitaiton and unit rules --    What Information Would Help Us Give You More Personalized Care? 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 --        Goal: Discharge Needs Assessment  Outcome: Ongoing (interventions implemented as appropriate)    01/06/17 1639 01/07/17 1110   Discharge Needs Assessment   Concerns To Be Addressed --  mental health concerns;substance/tobacco abuse/use concerns   Readmission Within The Last 30 Days --  no previous admission in last 30 days   Community Agency Name(S) --  [facility]    Current Discharge Risk --  psychiatric illness   Discharge Needs Assessment   Discharge Disposition --  home with family   Living Environment   Transportation Available family or friend will provide --       Met with patient and treatment team for initial assessment and treatment planning. Introduced self assigned therapist he was agreeable. Completed PSA treatment plan review  and integrated summary. Discussed treatment objectives and briefly discussed  disposition plans. He is agreeable to follow up at [facility].      The patient presented to the ER having suicidal thoughts with feelings of hopelessness and worthlessness. He reports having thoughts to cut his wrist and to taking a overdose of tylenol. 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 He reports feeling stressed and overwhelmed. He has no previous history of treatment. He reports if he had access to a gun he would have already harmed himself.      Treatment team to stabilize patients symptoms, provide 24/7 nursing supervision. Individual and group therapy to focus on healthy coping and schedule follow up care. Patient will be encouraged to involve his family in his treatment for safety and disposition planning.

## 2017-01-07 NOTE — PLAN OF CARE
Problem: BH Patient Care Overview (Adult)  Goal: Plan of Care Review  Outcome: Ongoing (interventions implemented as appropriate)  Rates anxiety 1 and depression 1-2. Denies suicidal thoughts.    01/07/17 1609   Coping/Psychosocial Response Interventions   Plan Of Care Reviewed With patient   Coping/Psychosocial   Patient Agreement with Plan of Care agrees   Patient Care Overview   Progress no change       Goal: Interdisciplinary Rounds/Family Conference  Outcome: Ongoing (interventions implemented as appropriate)  Goal: Individualization and Mutuality  Outcome: Ongoing (interventions implemented as appropriate)  Goal: Discharge Needs Assessment  Outcome: Ongoing (interventions implemented as appropriate)    Problem: BH Overarching Goals  Goal: Adheres to Safety Considerations for Self and Others  Outcome: Ongoing (interventions implemented as appropriate)  Goal: Optimized Coping Skills in Response to Life Stressors  Outcome: Ongoing (interventions implemented as appropriate)  Goal: Develops/Participates in Therapeutic [place] to Support Successful Transition  Outcome: Ongoing (interventions implemented as appropriate)    Problem: Coping, Compromised Individual (Adult,Obstetrics,Pediatric)  Goal: Identify Related Risk Factors and Signs and Symptoms  Outcome: Ongoing (interventions implemented as appropriate)  Goal: Effective Coping  Outcome: Ongoing (interventions implemented as appropriate)    Problem: Risk for Self Injury/Neglect  Goal: Treatment Goal: Remain safe during length of stay, learn and adopt new coping skills, and be free of self-injurious ideation, impulses and acts at the time of discharge  Outcome: Ongoing (interventions implemented as appropriate)  Goal: Verbalize thoughts and feelings associated with:  Outcome: Ongoing (interventions implemented as appropriate)  Goal: Refrain from harming self  Outcome: Ongoing (interventions implemented as appropriate)  Goal: Attend and participate in unit  activities, including therapeutic, recreational, and educational groups  Outcome: Ongoing (interventions implemented as appropriate)  Goal: Recognize maladaptive responses and adopt new coping mechanisms  Outcome: Ongoing (interventions implemented as appropriate)  Goal: Complete daily ADLs, including personal hygiene independently, as able  Outcome: Ongoing (interventions implemented as appropriate)

## 2017-01-07 NOTE — PLAN OF CARE
"Problem:  Patient Care Overview (Adult)  Goal: Plan of Care Review  Outcome: Ongoing (interventions implemented as appropriate)  Pt reports feeling better. States, \"I feel rested. The sleep really helped.\" Rates anxiety 2 and depression 0 on a scale 0-10. Denies SI. Pt reports increased stress r/t caring for his two small children,- 10 month and 3 year old. No acute complaints this shift.     01/07/17 0745   Coping/Psychosocial Response Interventions   Plan Of Care Reviewed With patient   Coping/Psychosocial   Patient Agreement with Plan of Care agrees   Patient Care Overview   Progress improving           "
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
## 2017-01-07 NOTE — PLAN OF CARE
Problem: BH Overarching Goals  Goal: Adheres to Safety Considerations for Self and Others  Outcome: Ongoing (interventions implemented as appropriate)

## 2017-01-07 NOTE — ED PROVIDER NOTES
Subjective   HPI Comments: 21 year old male who has had depression and suicidal thoughts for 3-4 years.  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  He denies any suicidal plan.  He denies HI.  He states he has been using marijuana and occasionally drinking alcohol.  He last use marijuana a week ago and drank alcohol last night.  He denies any hallucinations.    Patient is a 21 y.o. male presenting with mental health disorder.   History provided by:  Patient  Mental Health Problem   Presenting symptoms: depression and suicidal thoughts    Patient accompanied by: wife.  Degree of incapacity (severity):  Moderate  Onset quality:  Gradual  Duration: 3-4 years.  Timing:  Constant  Progression:  Worsening  Chronicity:  Chronic  Context: alcohol use and drug abuse    Relieved by:  Nothing  Worsened by:  Lack of sleep  Associated symptoms: anhedonia, anxiety, fatigue and insomnia    Risk factors: hx of mental illness and hx of suicide attempts        Review of Systems   Constitutional: Positive for fatigue.   HENT: Negative.    Eyes: Negative.    Respiratory: Negative.    Cardiovascular: Negative.    Gastrointestinal: Negative.    Genitourinary: Negative.    Musculoskeletal: Negative.    Skin: Negative.    Neurological: Negative.    Psychiatric/Behavioral: Positive for dysphoric mood, sleep disturbance and suicidal ideas. The patient is nervous/anxious and has insomnia.    All other systems reviewed and are negative.      Past Medical History   Diagnosis Date   • Anxiety    • Depression    • Heart valve regurgitation    • Self-injurious behavior    • Suicidal thoughts    • Suicide attempt      states his had tried to kill self multiple times. States he held knife few days ago but didnt do it       No Known Allergies    History reviewed. No pertinent past surgical history.    Family History   Problem Relation Age of Onset   • Drug  abuse Sister    • Suicide Attempts Sister    • Suicide Attempts Brother        Social History     Social History   • Marital status:      Spouse name: N/A   • Number of children: N/A   • Years of education: N/A     Social History Main Topics   • Smoking status: Current Every Day Smoker     Packs/day: 0.50     Years: 3.00     Types: Cigarettes   • Smokeless tobacco: Never Used   • Alcohol use Yes      Comment: A few times a month will drink a few beers   • Drug use: Yes     Special: Marijuana   • Sexual activity: Defer     Other Topics Concern   • None     Social History Narrative           Objective   Physical Exam   Constitutional: He is oriented to person, place, and time. He appears well-developed and well-nourished. No distress.   HENT:   Head: Normocephalic and atraumatic.   Right Ear: External ear normal.   Left Ear: External ear normal.   Nose: Nose normal.   Mouth/Throat: Oropharynx is clear and moist.   Eyes: Conjunctivae and EOM are normal. Pupils are equal, round, and reactive to light.   Neck: Normal range of motion. Neck supple.   Cardiovascular: Normal rate, regular rhythm, normal heart sounds and intact distal pulses.    Pulmonary/Chest: Effort normal and breath sounds normal. No respiratory distress.   Abdominal: Soft. Bowel sounds are normal.   Musculoskeletal: Normal range of motion.   Neurological: He is alert and oriented to person, place, and time.   Skin: Skin is warm and dry.   Psychiatric: His speech is normal and behavior is normal. Judgment normal. His mood appears anxious. Cognition and memory are normal. He exhibits a depressed mood. He expresses suicidal ideation. He expresses no homicidal ideation. He expresses no suicidal plans.   Nursing note and vitals reviewed.      Procedures         ED Course  ED Course   Comment By Time   Pt is medically clear and will be admitted. LEDA Morin 01/06 1542                  [facility]  Number of Diagnoses or Management Options  Depression with  suicidal ideation:      Amount and/or Complexity of Data Reviewed  Clinical lab tests: reviewed and ordered    Patient Progress  Patient progress: stable      Final diagnoses:   Depression with suicidal ideation            LEDA Morin  01/06/17 9553

## 2017-01-08 PROCEDURE — 99232 SBSQ HOSP IP/OBS MODERATE 35: CPT

## 2017-01-08 RX ADMIN — TRAZODONE HYDROCHLORIDE 50 MG: 50 TABLET ORAL at 22:27

## 2017-01-08 RX ADMIN — SERTRALINE HYDROCHLORIDE 50 MG: 50 TABLET, FILM COATED ORAL at 11:01

## 2017-01-08 NOTE — PROGRESS NOTES
"Subjective   Antony Salgado is a 21 y.o. male     Hospital Day #2    Chief Complaint:  depression    HPI:  History of Present Illness  Feeling a little better today. He tolerated starting the zoloft, but said it made him feel \"funny\" for a brief time and a little tired. We decided to change the zoloft to bedtime.   Depression rating 2/10  Anxiety rating 1/10  Sleep: good with trazodone    Review of Systems   Constitutional: Negative.    HENT: Negative.    Respiratory: Negative.    Cardiovascular: Negative.    Gastrointestinal: Negative.    Musculoskeletal: Negative.        Objective   Physical Exam   Constitutional: He appears well-developed and well-nourished. No distress.   Neurological: He is alert. Coordination and gait normal.   Vitals reviewed.      Wt Readings from Last 3 Encounters:   01/06/17 138 lb (62.6 kg)   01/06/17 135 lb (61.2 kg)     Temp Readings from Last 3 Encounters:   01/08/17 97.1 °F (36.2 °C) (Temporal Artery )   01/06/17 98.3 °F (36.8 °C) (Oral)     BP Readings from Last 3 Encounters:   01/08/17 118/78   01/06/17 146/82     Pulse Readings from Last 3 Encounters:   01/08/17 80   01/06/17 71       No Known Allergies    Lab Results (last 24 hours)     ** No results found for the last 24 hours. **          Imaging Results (last 24 hours)     ** No results found for the last 24 hours. **          Current Medications:     nicotine 1 patch Transdermal Daily   sertraline 50 mg Oral Daily     •  aluminum-magnesium hydroxide-simethicone  •  benzonatate  •  hydrOXYzine  •  ibuprofen  •  influenza vaccine  •  magnesium hydroxide  •  ondansetron  •  pneumococcal polysaccharide 23-valent  •  sodium chloride  •  traZODone      Mental Status Exam:   Appearance: Neatly, casually dressed, good hygeine.   Cooperation: Cooperative  Orientation: Ox4  Gait and station stable.   Psychomotor: No psychomotor agitation/retardation, No EPS, No motor tics  Speech: normal rate, amount.  Mood \"a little nervous\"   Affect: " congruent/appropriate.  Thought Content: goal directed, no delusional material present  Thought process: linear, organized.  Suicidality: No SI  Homicidality: No HI  Perception: No AH/VH. No overt psychosis.   Memory is intact for recent and remote events  Concentration: good  Impulse control: good  Insight: fair   Judgement: fair    Special Precaution Level: 3    Assessment/Plan:   Assessment/Plan   Patient Active Problem List   Diagnosis Code   • MDD (major depressive episode), single episode, severe, no psychosis F32.2       · Continue zoloft and trazodone. He is hoping to go home on Monday.     We discussed risks, benefits, and side effects of the above medication and the patient was agreeable with the plan. I have reviewed the treatment plan and agree with current plan.  Treatment was discussed with the patient who is agreeable to this treatment and plan.

## 2017-01-08 NOTE — PLAN OF CARE
Problem: BH Patient Care Overview (Adult)  Goal: Plan of Care Review  Outcome: Ongoing (interventions implemented as appropriate)  Pt reports feeling better. States he is missing his children. Denies anxiety/depression. Denies SI/HI. No acute complaints this shift.     01/08/17 0804   Coping/Psychosocial Response Interventions   Plan Of Care Reviewed With patient   Coping/Psychosocial   Patient Agreement with Plan of Care agrees   Patient Care Overview   Progress improving

## 2017-01-09 VITALS
WEIGHT: 138 LBS | BODY MASS INDEX: 20.92 KG/M2 | HEART RATE: 84 BPM | RESPIRATION RATE: 18 BRPM | TEMPERATURE: 98.7 F | DIASTOLIC BLOOD PRESSURE: 85 MMHG | HEIGHT: 68 IN | OXYGEN SATURATION: 98 % | SYSTOLIC BLOOD PRESSURE: 137 MMHG

## 2017-01-09 PROCEDURE — 99238 HOSP IP/OBS DSCHRG MGMT 30/<: CPT

## 2017-01-09 RX ORDER — TRAZODONE HYDROCHLORIDE 50 MG/1
50 TABLET ORAL NIGHTLY PRN
Qty: 30 TABLET | Refills: 0 | Status: SHIPPED | OUTPATIENT
Start: 2017-01-09

## 2017-01-09 NOTE — SIGNIFICANT NOTE
01/09/17 1200   [place] Suicide Severity Rating Scale (Discharge)   1. Wish to be Dead Yes   2. Suicidal Thoughts Yes   3. Suicidal Thoughts with Method Without Specific Plan or Intent to Act No   4. Suicidal Intent Without Specific Plan No   5. Suicide Intent with Specific Plan Yes   6. Suicide Behavior Question No    by the day of discharge he was denying any suicidal ideation

## 2017-01-09 NOTE — DISCHARGE SUMMARY
"Date of Discharge:  1/9/2017    Discharge Diagnosis:  Patient Active Problem List   Diagnosis Code   • MDD (major depressive episode), single episode, severe, no psychosis F32.2       Presenting Problem/History of Present Illness  Depression with suicidal ideation [F32.9, R45.851]     Hospital Course  Patient is a 21 y.o. male hospitalized for complaints of Suicidal Ideation, increased depression and anxiety. Patient states he has had suicidal ideations for years but has recently got progressively worse after the birth of his 2 sons. Patient is unable to disclose a specific plan but states that he has thought of a million different ways to kill himself. He states he has thought of cutting his wrists, hanging himself, and overdosing on tylenol and states he is lv he has not had access to a gun because he would already be dead. Patient reports he held a knife to his left forearm several times 2 days ago, however could not carry through with self harm because he didn't want to cause an inconvenience to his wife and family. He states he feels hopeless, helpless, powerless and worthless. He reports a good appetite and states he only sleeps 2 to 3 hrs a night. Patient reports getting extremely stressed out when the babies cry and states he has to just \"walk away\". 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 Patient denies any Homicidal Ideations, or auditory/visual hallucinations.  We started Zoloft and titrated up to 50 mg which he tolerated fairly well and trazodone 50 mg which was effective for sleep.  He reported that the Zoloft relaxed him an improved his mood almost immediately.  By the day of discharge he reported that he was looking forward to going home, felt calmer and his mood was much better and he was sleeping much better.  He said he talked to his family and they had already started the process " "of coming up with the plan to DC his stress, including utilizing a local  center to help with caring for the children and other family members had also offered to step in and help with caretaking to ease his stress level.  He felt much better about the situation after this.  By the day of discharge he was denying any suicidal ideation, was future oriented and said his mood was much better.  He appeared safe for discharge with outpatient follow-up.      Procedures Performed       None    Consults:   Consults     No orders found from 12/8/2016 to 1/7/2017.          Pertinent Test Results:   Lab Results (last 7 days)     ** No results found for the last 168 hours. **          Mental Status Exam at Discharge:  Appearance: Neatly, casually dressed, good hygeine.   Cooperation: Cooperative  Orientation: Ox4  Gait and station stable.   Psychomotor: No psychomotor agitation/retardation, No EPS, No motor tics  Speech: normal rate, amount.  Mood \"better\"   Affect: congruent/appropriate.  Thought Content: goal directed, no delusional material present  Thought process: linear, organized.  Suicidality: No SI  Homicidality: No HI  Perception: No AH/VH. No overt psychosis.   Memory is intact for recent and remote events  Concentration: good  Impulse control: good  Insight: fair   Judgement: fair    Condition on Discharge:  stable    Vital Signs  Temp:  [98.6 °F (37 °C)-98.7 °F (37.1 °C)] 98.6 °F (37 °C)  Heart Rate:  [64-81] 81  Resp:  [18] 18  BP: (117-119)/(71-80) 119/80    Discharge Disposition  Home or Self Care    Discharge Medications   Antony Salgado   Home Medication Instructions SYDNEY:[id number]    Printed on:01/09/17 1200   Medication Information                      sertraline (ZOLOFT) 50 MG tablet  Take 1 tablet by mouth Every Night.             traZODone (DESYREL) 50 MG tablet  Take 1 tablet by mouth At Night As Needed for sleep (give between 9 pm and 2 am).                 Discharge Diet:   Diet Instructions  "    Advance Diet As Tolerated                     Activity at Discharge:   Activity Instructions     Activity as Tolerated                     Follow-up Appointments  Therapist and navigators will arrange follow-up appointment either at the [facility] or [facility] in [place] prior to discharge      Test Results Pending at Discharge    None     Toan Workman MD  01/09/17  12:00 PM

## 2017-01-09 NOTE — PLAN OF CARE
Problem: BH Patient Care Overview (Adult)  Goal: Plan of Care Review  Outcome: Ongoing (interventions implemented as appropriate)  No acute complaints this shift. Pt states he is missing his children. Denies anxiety/depression. Denies SI/HI. Slept well.    01/09/17 0455   Coping/Psychosocial Response Interventions   Plan Of Care Reviewed With patient   Coping/Psychosocial   Patient Agreement with Plan of Care agrees   Patient Care Overview   Progress improving

## 2017-01-09 NOTE — DISCHARGE INSTR - APPOINTMENTS
[place] [address]  [facility] [address]  [phone]    January 13 2017 at 9:15am with Uzma

## 2017-01-25 ENCOUNTER — OFFICE VISIT (OUTPATIENT)
Dept: PSYCHIATRY | Facility: CLINIC | Age: 22
End: 2017-01-25

## 2017-01-25 VITALS
WEIGHT: 137.4 LBS | BODY MASS INDEX: 20.82 KG/M2 | SYSTOLIC BLOOD PRESSURE: 130 MMHG | HEART RATE: 93 BPM | HEIGHT: 68 IN | DIASTOLIC BLOOD PRESSURE: 81 MMHG

## 2017-01-25 DIAGNOSIS — F33.1 MAJOR DEPRESSIVE DISORDER, RECURRENT EPISODE, MODERATE (HCC): Primary | ICD-10-CM

## 2017-01-25 DIAGNOSIS — F32.2 MDD (MAJOR DEPRESSIVE EPISODE), SINGLE EPISODE, SEVERE, NO PSYCHOSIS (HCC): ICD-10-CM

## 2017-01-25 PROCEDURE — 99214 OFFICE O/P EST MOD 30 MIN: CPT | Performed by: NURSE PRACTITIONER

## 2017-01-25 NOTE — MR AVS SNAPSHOT
"                        Antony Salgado   1/25/2017 10:20 AM   Office Visit    Dept Phone:  [phone]   Encounter #:  [id number]    Provider:  FAVIOLA Garcia   Department:  [facility]                Your Full Care Plan              Your Updated Medication List          This list is accurate as of: 1/25/17 11:12 AM.  Always use your most recent med list.                sertraline 50 MG tablet   Commonly known as:  ZOLOFT   Take 1 tablet by mouth Every Night.       traZODone 50 MG tablet   Commonly known as:  DESYREL   Take 1 tablet by mouth At Night As Needed for sleep (give between 9 pm and 2 am).               Instructions     None    Patient Instructions History      Upcoming Appointments     Visit Type Date Time Department    MEDICINE CHECK 1/25/2017 10:20 AM ALEJANDRO Barajashart Signup     Our records indicate that you have declined [facility] MyChart signup. If you would like to sign up for [facility], please email [email or web address] or call [phone] to obtain an activation code.             Other Info from Your Visit           Allergies     No Known Allergies      Vital Signs     Blood Pressure Pulse Height Weight Body Mass Index Smoking Status    130/81 93 68\" (172.7 cm) 137 lb 6.4 oz (62.3 kg) 20.89 kg/m2 Current Every Day Smoker        "

## 2017-01-25 NOTE — PROGRESS NOTES
Subjective   Antony Salgado is a 21 y.o. male is here today for medication management follow-up after being discharged from the [facility] where he was admitted for suicidal ideations.  He presents with his wife with whom he gets per permission to speak in front of openly.    Chief Complaint: Discharge from hospital follow-up      History of Present Illness  patient states that he was admitted because he began having worsening depression over the past 5 years to the point he was having significant suicidal thoughts.  He states that they became worse after the birth of his second son.  While hospitalized he was started on Zoloft for his symptoms and states that he believes that he is doing better.  He rates his depression 4/10, denies any symptoms of anxiety.  He uses the trazodone as needed for sleep, at times he is worried because he is afraid he will not be able to wake up to take care of his children.  He is normally getting approximately 8 hours per night with occasional nightmare.  Appetite is good with no significant weight changes.  Current stressors have been related to his son's being sick, he is currently having problems with headaches and a cough.  Denies any substance use.  Denies any auditory or visual hallucinations, denies any suicidal or homicidal ideations.  Patient states that they are scheduled to see [facility] in [place] later this week, refill will be given to sustain them until they have a psychiatric evaluation.  They will maintain treatment at [facility].    The following portions of the patient's history were reviewed and updated as appropriate: allergies, current medications, past family history, past medical history, past social history, past surgical history and problem list.    Review of Systems   Constitutional: Negative for appetite change, chills, diaphoresis, fatigue, fever and unexpected weight change.   HENT: Positive for congestion. Negative for hearing loss, sore throat,  "trouble swallowing and voice change.    Eyes: Negative for photophobia and visual disturbance.   Respiratory: Negative for cough, chest tightness and shortness of breath.    Cardiovascular: Negative for chest pain and palpitations.   Gastrointestinal: Negative for abdominal pain, constipation, nausea and vomiting.   Endocrine: Negative for cold intolerance and heat intolerance.   Genitourinary: Negative for dysuria and frequency.   Musculoskeletal: Negative for arthralgias, back pain, joint swelling and neck stiffness.   Skin: Negative for color change and wound.   Allergic/Immunologic: Negative for environmental allergies and immunocompromised state.   Neurological: Positive for headaches. Negative for dizziness, tremors, seizures, syncope, weakness and light-headedness.   Hematological: Negative for adenopathy. Does not bruise/bleed easily.       Objective   Physical Exam   Constitutional: He appears well-developed and well-nourished. No distress.   Neurological: He is alert. Coordination and gait normal.   Vitals reviewed.    Blood pressure 130/81, pulse 93, height 68\" (172.7 cm), weight 137 lb 6.4 oz (62.3 kg).    Medication List:   Current Outpatient Prescriptions   Medication Sig Dispense Refill   • sertraline (ZOLOFT) 50 MG tablet Take 1 tablet by mouth Every Night. 30 tablet 0   • traZODone (DESYREL) 50 MG tablet Take 1 tablet by mouth At Night As Needed for sleep (give between 9 pm and 2 am). 30 tablet 0     No current facility-administered medications for this visit.        Mental Status Exam:   Hygiene:   good  Cooperation:  Cooperative  Eye Contact:  Fair  Psychomotor Behavior:  Appropriate  Affect:  Appropriate  Hopelessness: Denies  Speech:  Monotone  Thought Process:  Linear  Thought Content:  Normal  Suicidal:  None  Homicidal:  None  Hallucinations:  None  Delusion:  None  Memory:  Intact  Orientation:  Person, Place, Time and Situation  Reliability:  fair  Insight:  Fair  Judgement:  Fair  Impulse " Control:  Fair  Physical/Medical Issues:  Yes Headache    Assessment/Plan   Diagnoses and all orders for this visit:    Major depressive disorder, recurrent episode, moderate    MDD (major depressive episode), single episode, severe, no psychosis  -     sertraline (ZOLOFT) 50 MG tablet; Take 1 tablet by mouth Every Night.          Discussed medication options.  Continue Zoloft for depression and anxiety.  Patient has supply of trazodone which he will continue as needed.  Reviewed the risks, benefits, and side effects of the medications; patient acknowledged and verbally consented.  Patient is agreeable to call the [facility].  Patient is aware to call 911 or go to the nearest ER should begin having SI/HI.       no report return appointments made at this time.  But made aware of available services if needed.

## 2020-09-15 ENCOUNTER — HOSPITAL ENCOUNTER (EMERGENCY)
Facility: HOSPITAL | Age: 25
Discharge: HOME | End: 2020-09-15
Attending: STUDENT IN AN ORGANIZED HEALTH CARE EDUCATION/TRAINING PROGRAM | Admitting: FAMILY MEDICINE

## 2020-09-15 VITALS
BODY MASS INDEX: 25.11 KG/M2 | HEIGHT: 67 IN | OXYGEN SATURATION: 98 % | DIASTOLIC BLOOD PRESSURE: 68 MMHG | HEART RATE: 70 BPM | SYSTOLIC BLOOD PRESSURE: 122 MMHG | WEIGHT: 160 LBS | RESPIRATION RATE: 16 BRPM | TEMPERATURE: 98 F

## 2020-09-15 DIAGNOSIS — R11.0 NAUSEA: Primary | ICD-10-CM

## 2020-09-15 LAB
ALBUMIN SERPL-MCNC: 4.91 G/DL (ref 3.5–5.2)
ALBUMIN/GLOB SERPL: 1.6 G/DL
ALP SERPL-CCNC: 107 U/L (ref 39–117)
ALT SERPL W P-5'-P-CCNC: 21 U/L (ref 1–41)
ANION GAP SERPL CALCULATED.3IONS-SCNC: 12.1 MMOL/L (ref 5–15)
AST SERPL-CCNC: 27 U/L (ref 1–40)
BASOPHILS # BLD AUTO: 0.06 10*3/MM3 (ref 0–0.2)
BASOPHILS NFR BLD AUTO: 0.9 % (ref 0–1.5)
BILIRUB SERPL-MCNC: 0.7 MG/DL (ref 0–1.2)
BUN SERPL-MCNC: 11 MG/DL (ref 6–20)
BUN/CREAT SERPL: 12.2 (ref 7–25)
CALCIUM SPEC-SCNC: 9.9 MG/DL (ref 8.6–10.5)
CHLORIDE SERPL-SCNC: 99 MMOL/L (ref 98–107)
CO2 SERPL-SCNC: 25.9 MMOL/L (ref 22–29)
CREAT SERPL-MCNC: 0.9 MG/DL (ref 0.76–1.27)
DEPRECATED RDW RBC AUTO: 40.4 FL (ref 37–54)
EOSINOPHIL # BLD AUTO: 0.08 10*3/MM3 (ref 0–0.4)
EOSINOPHIL NFR BLD AUTO: 1.1 % (ref 0.3–6.2)
ERYTHROCYTE [DISTWIDTH] IN BLOOD BY AUTOMATED COUNT: 12.2 % (ref 12.3–15.4)
FLUAV AG NPH QL: NEGATIVE
FLUBV AG NPH QL IA: NEGATIVE
GFR SERPL CREATININE-BSD FRML MDRD: 101 ML/MIN/1.73
GLOBULIN UR ELPH-MCNC: 3 GM/DL
GLUCOSE SERPL-MCNC: 100 MG/DL (ref 65–99)
HCT VFR BLD AUTO: 42.3 % (ref 37.5–51)
HGB BLD-MCNC: 14.3 G/DL (ref 13–17.7)
IMM GRANULOCYTES # BLD AUTO: 0.02 10*3/MM3 (ref 0–0.05)
IMM GRANULOCYTES NFR BLD AUTO: 0.3 % (ref 0–0.5)
LIPASE SERPL-CCNC: 12 U/L (ref 13–60)
LYMPHOCYTES # BLD AUTO: 0.81 10*3/MM3 (ref 0.7–3.1)
LYMPHOCYTES NFR BLD AUTO: 11.5 % (ref 19.6–45.3)
MCH RBC QN AUTO: 30.4 PG (ref 26.6–33)
MCHC RBC AUTO-ENTMCNC: 33.8 G/DL (ref 31.5–35.7)
MCV RBC AUTO: 89.8 FL (ref 79–97)
MONOCYTES # BLD AUTO: 0.54 10*3/MM3 (ref 0.1–0.9)
MONOCYTES NFR BLD AUTO: 7.7 % (ref 5–12)
NEUTROPHILS NFR BLD AUTO: 5.51 10*3/MM3 (ref 1.7–7)
NEUTROPHILS NFR BLD AUTO: 78.5 % (ref 42.7–76)
NRBC BLD AUTO-RTO: 0 /100 WBC (ref 0–0.2)
PLATELET # BLD AUTO: 225 10*3/MM3 (ref 140–450)
PMV BLD AUTO: 9.8 FL (ref 6–12)
POTASSIUM SERPL-SCNC: 4.3 MMOL/L (ref 3.5–5.2)
PROT SERPL-MCNC: 7.9 G/DL (ref 6–8.5)
RBC # BLD AUTO: 4.71 10*6/MM3 (ref 4.14–5.8)
SARS-COV-2 RDRP RESP QL NAA+PROBE: NOT DETECTED
SODIUM SERPL-SCNC: 137 MMOL/L (ref 136–145)
WBC # BLD AUTO: 7.02 10*3/MM3 (ref 3.4–10.8)

## 2020-09-15 PROCEDURE — 87804 INFLUENZA ASSAY W/OPTIC: CPT | Performed by: NURSE PRACTITIONER

## 2020-09-15 PROCEDURE — 99283 EMERGENCY DEPT VISIT LOW MDM: CPT

## 2020-09-15 PROCEDURE — 25010000002 ONDANSETRON PER 1 MG: Performed by: NURSE PRACTITIONER

## 2020-09-15 PROCEDURE — 83690 ASSAY OF LIPASE: CPT | Performed by: NURSE PRACTITIONER

## 2020-09-15 PROCEDURE — 80053 COMPREHEN METABOLIC PANEL: CPT | Performed by: NURSE PRACTITIONER

## 2020-09-15 PROCEDURE — 87635 SARS-COV-2 COVID-19 AMP PRB: CPT | Performed by: NURSE PRACTITIONER

## 2020-09-15 PROCEDURE — 96361 HYDRATE IV INFUSION ADD-ON: CPT

## 2020-09-15 PROCEDURE — 85025 COMPLETE CBC W/AUTO DIFF WBC: CPT | Performed by: NURSE PRACTITIONER

## 2020-09-15 PROCEDURE — 96374 THER/PROPH/DIAG INJ IV PUSH: CPT

## 2020-09-15 RX ORDER — ONDANSETRON 4 MG/1
4 TABLET, ORALLY DISINTEGRATING ORAL EVERY 6 HOURS PRN
Qty: 10 TABLET | Refills: 0 | Status: SHIPPED | OUTPATIENT
Start: 2020-09-15

## 2020-09-15 RX ORDER — SODIUM CHLORIDE 0.9 % (FLUSH) 0.9 %
10 SYRINGE (ML) INJECTION AS NEEDED
Status: DISCONTINUED | OUTPATIENT
Start: 2020-09-15 | End: 2020-09-15 | Stop reason: HOSPADM

## 2020-09-15 RX ORDER — ONDANSETRON 2 MG/ML
4 INJECTION INTRAMUSCULAR; INTRAVENOUS ONCE
Status: COMPLETED | OUTPATIENT
Start: 2020-09-15 | End: 2020-09-15

## 2020-09-15 RX ADMIN — ONDANSETRON 4 MG: 2 INJECTION INTRAMUSCULAR; INTRAVENOUS at 21:01

## 2020-09-15 RX ADMIN — SODIUM CHLORIDE 1000 ML: 9 INJECTION, SOLUTION INTRAVENOUS at 21:00

## 2020-09-16 NOTE — DISCHARGE INSTRUCTIONS
Call one of the offices below to establish a primary care provider.  If you are unable to get an appointment and feel it is an emergency and need to be seen immediately please return to the Emergency Department.    Call one of the office below to set up a primary care provider.    Dr. Ruben Abrams                                                                                                       [address] [facility] [address]  [phone]    Dr. Orr, Dr. YESICA Javier, Dr. RUY Javier ([facility])  [address] [facility] [address]  [phone]    Dr. Lockwood, Dr. Braun, Dr. Erazo ([facility])  [address] [facility] [address]  [phone]    Dr. Infante  [address] [facility] [address]  [phone]    Dr. Andino, Dr. Bowers, Dr. Brown, Dr. Drake (Critical access hospital)  [address] [place] [address] [address]  [facility] [address]  [phone]    Dr. Jordana Kim  [address] [facility] [place] [address]  [phone]    Dr. Silva June  [address]   [address]  [facility] [address]  [phone]    Dr. Abrams  [address] [facility] [address]  [phone]    Dr. Ibanez, Dr. Mohamud  [address] [facility] [place] [address]  [phone]    Dr. Coker  [address]                                                              [address]  [facility] [address]  [phone]    Dr. Claros  [address] [facility] [address]  [phone]    Dr. Zulay Raymundo  [address]  [address]  [place] [address]  [phone]    Dr. Arriaga and [facility]   [address] [facility]  [address]  [place] [address]  [phone]

## 2020-09-16 NOTE — ED PROVIDER NOTES
Subjective     Nausea  The primary symptoms include nausea. Primary symptoms comment: malaiase, fever. The illness began today. The onset was sudden. The problem has not changed since onset.  The illness is also significant for chills. Associated medical issues do not include inflammatory bowel disease, GERD, liver disease, alcohol abuse, bowel resection or hemorrhoids. Risk factors: Possible sick contacts.       Review of Systems   Constitutional: Positive for chills.   HENT: Negative.    Eyes: Negative.    Respiratory: Negative.    Cardiovascular: Negative.    Gastrointestinal: Positive for nausea.   Endocrine: Negative.    Genitourinary: Negative.    Musculoskeletal: Negative.    Skin: Negative.    Allergic/Immunologic: Negative.    Neurological: Negative.    Hematological: Negative.    Psychiatric/Behavioral: Negative.        Past Medical History:   Diagnosis Date   • Anxiety    • Depression    • Heart valve regurgitation    • Self-injurious behavior    • Suicidal thoughts    • Suicide attempt     states his had tried to kill self multiple times. States he held knife few days ago but didnt do it       No Known Allergies    No past surgical history on file.    Family History   Problem Relation Age of Onset   • Drug abuse Sister    • Suicide Attempts Sister    • Suicide Attempts Brother        Social History     Socioeconomic History   • Marital status:      Spouse name: Not on file   • Number of children: Not on file   • Years of education: Not on file   • Highest education level: Not on file   Tobacco Use   • Smoking status: Current Every Day Smoker     Packs/day: 0.50     Years: 3.00     Pack years: 1.50     Types: Cigarettes   • Smokeless tobacco: Never Used   Substance and Sexual Activity   • Alcohol use: Yes     Comment: A few times a month will drink a few beers   • Drug use: Yes     Types: Marijuana   • Sexual activity: Defer     Partners: Female           Objective   Physical Exam  Vitals signs and  nursing note reviewed.   Constitutional:       Appearance: He is well-developed.   HENT:      Head: Normocephalic.      Right Ear: External ear normal.      Left Ear: External ear normal.   Eyes:      Conjunctiva/sclera: Conjunctivae normal.      Pupils: Pupils are equal, round, and reactive to light.   Neck:      Musculoskeletal: Normal range of motion and neck supple.   Cardiovascular:      Rate and Rhythm: Normal rate and regular rhythm.      Heart sounds: Normal heart sounds.   Pulmonary:      Effort: Pulmonary effort is normal.      Breath sounds: Normal breath sounds.   Abdominal:      General: Bowel sounds are normal.      Palpations: Abdomen is soft.   Musculoskeletal: Normal range of motion.   Skin:     General: Skin is warm and dry.      Capillary Refill: Capillary refill takes less than 2 seconds.   Neurological:      Mental Status: He is alert and oriented to person, place, and time.   Psychiatric:         Behavior: Behavior normal.         Thought Content: Thought content normal.         Procedures           ED Course                                           MDM    Final diagnoses:   Nausea            Rui Myrick, APRN  09/15/20 4829

## 2023-05-25 ENCOUNTER — HOSPITAL ENCOUNTER (EMERGENCY)
Facility: HOSPITAL | Age: 28
Discharge: STILL ADMITTED OR ADMINISTRATIVE | DRG: 885 | End: 2023-05-25
Attending: EMERGENCY MEDICINE
Payer: MEDICAID

## 2023-05-25 ENCOUNTER — HOSPITAL ENCOUNTER (INPATIENT)
Facility: HOSPITAL | Age: 28
LOS: 6 days | Discharge: HOME | DRG: 885 | End: 2023-05-31
Attending: PSYCHIATRY & NEUROLOGY | Admitting: PSYCHIATRY & NEUROLOGY
Payer: MEDICAID

## 2023-05-25 VITALS
TEMPERATURE: 97.5 F | BODY MASS INDEX: 23.54 KG/M2 | HEART RATE: 80 BPM | DIASTOLIC BLOOD PRESSURE: 94 MMHG | OXYGEN SATURATION: 98 % | HEIGHT: 67 IN | SYSTOLIC BLOOD PRESSURE: 149 MMHG | WEIGHT: 150 LBS | RESPIRATION RATE: 16 BRPM

## 2023-05-25 DIAGNOSIS — F32.A DEPRESSION WITH SUICIDAL IDEATION: Primary | ICD-10-CM

## 2023-05-25 DIAGNOSIS — R45.851 DEPRESSION WITH SUICIDAL IDEATION: Primary | ICD-10-CM

## 2023-05-25 PROBLEM — F32.9 MDD (MAJOR DEPRESSIVE DISORDER): Status: ACTIVE | Noted: 2023-01-01

## 2023-05-25 LAB
ALBUMIN SERPL-MCNC: 4.7 G/DL (ref 3.5–5.2)
ALBUMIN/GLOB SERPL: 1.5 G/DL
ALP SERPL-CCNC: 96 U/L (ref 39–117)
ALT SERPL W P-5'-P-CCNC: 18 U/L (ref 1–41)
AMPHET+METHAMPHET UR QL: NEGATIVE
AMPHETAMINES UR QL: NEGATIVE
ANION GAP SERPL CALCULATED.3IONS-SCNC: 10.7 MMOL/L (ref 5–15)
AST SERPL-CCNC: 24 U/L (ref 1–40)
BARBITURATES UR QL SCN: NEGATIVE
BASOPHILS # BLD AUTO: 0.09 10*3/MM3 (ref 0–0.2)
BASOPHILS NFR BLD AUTO: 1.7 % (ref 0–1.5)
BENZODIAZ UR QL SCN: NEGATIVE
BILIRUB SERPL-MCNC: 0.4 MG/DL (ref 0–1.2)
BILIRUB UR QL STRIP: NEGATIVE
BUN SERPL-MCNC: 8 MG/DL (ref 6–20)
BUN/CREAT SERPL: 11.1 (ref 7–25)
BUPRENORPHINE SERPL-MCNC: NEGATIVE NG/ML
CALCIUM SPEC-SCNC: 9.9 MG/DL (ref 8.6–10.5)
CANNABINOIDS SERPL QL: NEGATIVE
CHLORIDE SERPL-SCNC: 98 MMOL/L (ref 98–107)
CLARITY UR: CLEAR
CO2 SERPL-SCNC: 27.3 MMOL/L (ref 22–29)
COCAINE UR QL: NEGATIVE
COLOR UR: YELLOW
CREAT SERPL-MCNC: 0.72 MG/DL (ref 0.76–1.27)
DEPRECATED RDW RBC AUTO: 39.8 FL (ref 37–54)
EGFRCR SERPLBLD CKD-EPI 2021: 126 ML/MIN/1.73
EOSINOPHIL # BLD AUTO: 0.11 10*3/MM3 (ref 0–0.4)
EOSINOPHIL NFR BLD AUTO: 2.1 % (ref 0.3–6.2)
ERYTHROCYTE [DISTWIDTH] IN BLOOD BY AUTOMATED COUNT: 11.9 % (ref 12.3–15.4)
ETHANOL BLD-MCNC: <10 MG/DL (ref 0–10)
ETHANOL UR QL: <0.01 %
FLUAV RNA RESP QL NAA+PROBE: NOT DETECTED
FLUBV RNA RESP QL NAA+PROBE: NOT DETECTED
GLOBULIN UR ELPH-MCNC: 3.1 GM/DL
GLUCOSE SERPL-MCNC: 93 MG/DL (ref 65–99)
GLUCOSE UR STRIP-MCNC: NEGATIVE MG/DL
HCT VFR BLD AUTO: 45.9 % (ref 37.5–51)
HGB BLD-MCNC: 16 G/DL (ref 13–17.7)
HGB UR QL STRIP.AUTO: NEGATIVE
HOLD SPECIMEN: NORMAL
HOLD SPECIMEN: NORMAL
IMM GRANULOCYTES # BLD AUTO: 0.02 10*3/MM3 (ref 0–0.05)
IMM GRANULOCYTES NFR BLD AUTO: 0.4 % (ref 0–0.5)
KETONES UR QL STRIP: ABNORMAL
LEUKOCYTE ESTERASE UR QL STRIP.AUTO: NEGATIVE
LYMPHOCYTES # BLD AUTO: 1.19 10*3/MM3 (ref 0.7–3.1)
LYMPHOCYTES NFR BLD AUTO: 22.4 % (ref 19.6–45.3)
MAGNESIUM SERPL-MCNC: 1.9 MG/DL (ref 1.6–2.6)
MCH RBC QN AUTO: 32 PG (ref 26.6–33)
MCHC RBC AUTO-ENTMCNC: 34.9 G/DL (ref 31.5–35.7)
MCV RBC AUTO: 91.8 FL (ref 79–97)
METHADONE UR QL SCN: NEGATIVE
MONOCYTES # BLD AUTO: 0.47 10*3/MM3 (ref 0.1–0.9)
MONOCYTES NFR BLD AUTO: 8.9 % (ref 5–12)
NEUTROPHILS NFR BLD AUTO: 3.43 10*3/MM3 (ref 1.7–7)
NEUTROPHILS NFR BLD AUTO: 64.5 % (ref 42.7–76)
NITRITE UR QL STRIP: NEGATIVE
NRBC BLD AUTO-RTO: 0 /100 WBC (ref 0–0.2)
OPIATES UR QL: NEGATIVE
OXYCODONE UR QL SCN: NEGATIVE
PCP UR QL SCN: NEGATIVE
PH UR STRIP.AUTO: >=9 [PH] (ref 5–8)
PLATELET # BLD AUTO: 282 10*3/MM3 (ref 140–450)
PMV BLD AUTO: 9.5 FL (ref 6–12)
POTASSIUM SERPL-SCNC: 4.4 MMOL/L (ref 3.5–5.2)
PROPOXYPH UR QL: NEGATIVE
PROT SERPL-MCNC: 7.8 G/DL (ref 6–8.5)
PROT UR QL STRIP: ABNORMAL
RBC # BLD AUTO: 5 10*6/MM3 (ref 4.14–5.8)
SARS-COV-2 RNA RESP QL NAA+PROBE: NOT DETECTED
SODIUM SERPL-SCNC: 136 MMOL/L (ref 136–145)
SP GR UR STRIP: 1.02 (ref 1–1.03)
TRICYCLICS UR QL SCN: NEGATIVE
UROBILINOGEN UR QL STRIP: ABNORMAL
WBC NRBC COR # BLD: 5.31 10*3/MM3 (ref 3.4–10.8)
WHOLE BLOOD HOLD COAG: NORMAL
WHOLE BLOOD HOLD SPECIMEN: NORMAL

## 2023-05-25 PROCEDURE — 83735 ASSAY OF MAGNESIUM: CPT | Performed by: PHYSICIAN ASSISTANT

## 2023-05-25 PROCEDURE — 82077 ASSAY SPEC XCP UR&BREATH IA: CPT | Performed by: PHYSICIAN ASSISTANT

## 2023-05-25 PROCEDURE — 36415 COLL VENOUS BLD VENIPUNCTURE: CPT

## 2023-05-25 PROCEDURE — 81003 URINALYSIS AUTO W/O SCOPE: CPT | Performed by: PHYSICIAN ASSISTANT

## 2023-05-25 PROCEDURE — 80053 COMPREHEN METABOLIC PANEL: CPT | Performed by: PHYSICIAN ASSISTANT

## 2023-05-25 PROCEDURE — 80306 DRUG TEST PRSMV INSTRMNT: CPT | Performed by: PHYSICIAN ASSISTANT

## 2023-05-25 PROCEDURE — 99285 EMERGENCY DEPT VISIT HI MDM: CPT

## 2023-05-25 PROCEDURE — 93005 ELECTROCARDIOGRAM TRACING: CPT | Performed by: PSYCHIATRY & NEUROLOGY

## 2023-05-25 PROCEDURE — 93010 ELECTROCARDIOGRAM REPORT: CPT | Performed by: INTERNAL MEDICINE

## 2023-05-25 PROCEDURE — 87636 SARSCOV2 & INF A&B AMP PRB: CPT | Performed by: PHYSICIAN ASSISTANT

## 2023-05-25 PROCEDURE — 85025 COMPLETE CBC W/AUTO DIFF WBC: CPT | Performed by: PHYSICIAN ASSISTANT

## 2023-05-25 RX ORDER — LORAZEPAM 2 MG/1
2 TABLET ORAL EVERY 4 HOURS PRN
Status: ACTIVE | OUTPATIENT
Start: 2023-05-26 | End: 2023-05-27

## 2023-05-25 RX ORDER — ONDANSETRON 4 MG/1
4 TABLET, FILM COATED ORAL EVERY 6 HOURS PRN
Status: DISCONTINUED | OUTPATIENT
Start: 2023-05-25 | End: 2023-05-31 | Stop reason: HOSPADM

## 2023-05-25 RX ORDER — ECHINACEA PURPUREA EXTRACT 125 MG
2 TABLET ORAL AS NEEDED
Status: DISCONTINUED | OUTPATIENT
Start: 2023-05-25 | End: 2023-05-31 | Stop reason: HOSPADM

## 2023-05-25 RX ORDER — LORAZEPAM 2 MG/1
2 TABLET ORAL
Status: DISPENSED | OUTPATIENT
Start: 2023-05-25 | End: 2023-05-26

## 2023-05-25 RX ORDER — IBUPROFEN 400 MG/1
400 TABLET ORAL EVERY 6 HOURS PRN
Status: DISCONTINUED | OUTPATIENT
Start: 2023-05-25 | End: 2023-05-31 | Stop reason: HOSPADM

## 2023-05-25 RX ORDER — ALUMINA, MAGNESIA, AND SIMETHICONE 2400; 2400; 240 MG/30ML; MG/30ML; MG/30ML
15 SUSPENSION ORAL EVERY 6 HOURS PRN
Status: DISCONTINUED | OUTPATIENT
Start: 2023-05-25 | End: 2023-05-31 | Stop reason: HOSPADM

## 2023-05-25 RX ORDER — MULTIVITAMIN WITH IRON
2 TABLET ORAL DAILY
Status: DISCONTINUED | OUTPATIENT
Start: 2023-05-25 | End: 2023-05-31 | Stop reason: HOSPADM

## 2023-05-25 RX ORDER — LORAZEPAM 1 MG/1
1 TABLET ORAL EVERY 4 HOURS PRN
Status: ACTIVE | OUTPATIENT
Start: 2023-05-28 | End: 2023-05-29

## 2023-05-25 RX ORDER — TRAZODONE HYDROCHLORIDE 50 MG/1
50 TABLET ORAL NIGHTLY PRN
Status: DISCONTINUED | OUTPATIENT
Start: 2023-05-25 | End: 2023-05-31 | Stop reason: HOSPADM

## 2023-05-25 RX ORDER — LORAZEPAM 0.5 MG/1
0.5 TABLET ORAL EVERY 4 HOURS PRN
Status: ACTIVE | OUTPATIENT
Start: 2023-05-29 | End: 2023-05-30

## 2023-05-25 RX ORDER — FAMOTIDINE 20 MG/1
20 TABLET, FILM COATED ORAL 2 TIMES DAILY PRN
Status: DISCONTINUED | OUTPATIENT
Start: 2023-05-25 | End: 2023-05-31 | Stop reason: HOSPADM

## 2023-05-25 RX ORDER — CLONIDINE HYDROCHLORIDE 0.1 MG/1
0.1 TABLET ORAL 3 TIMES DAILY PRN
Status: DISCONTINUED | OUTPATIENT
Start: 2023-05-25 | End: 2023-05-31 | Stop reason: HOSPADM

## 2023-05-25 RX ORDER — MULTIPLE VITAMINS W/ MINERALS TAB 9MG-400MCG
1 TAB ORAL DAILY
Status: DISCONTINUED | OUTPATIENT
Start: 2023-05-25 | End: 2023-05-31 | Stop reason: HOSPADM

## 2023-05-25 RX ORDER — BENZONATATE 100 MG/1
100 CAPSULE ORAL 3 TIMES DAILY PRN
Status: DISCONTINUED | OUTPATIENT
Start: 2023-05-25 | End: 2023-05-31 | Stop reason: HOSPADM

## 2023-05-25 RX ORDER — LOPERAMIDE HYDROCHLORIDE 2 MG/1
2 CAPSULE ORAL
Status: DISCONTINUED | OUTPATIENT
Start: 2023-05-25 | End: 2023-05-31 | Stop reason: HOSPADM

## 2023-05-25 RX ORDER — LORAZEPAM 0.5 MG/1
0.5 TABLET ORAL
Status: DISCONTINUED | OUTPATIENT
Start: 2023-05-29 | End: 2023-05-26

## 2023-05-25 RX ORDER — BENZTROPINE MESYLATE 1 MG/ML
1 INJECTION INTRAMUSCULAR; INTRAVENOUS ONCE AS NEEDED
Status: DISCONTINUED | OUTPATIENT
Start: 2023-05-25 | End: 2023-05-31 | Stop reason: HOSPADM

## 2023-05-25 RX ORDER — NICOTINE 21 MG/24HR
1 PATCH, TRANSDERMAL 24 HOURS TRANSDERMAL
Status: DISCONTINUED | OUTPATIENT
Start: 2023-05-25 | End: 2023-05-31 | Stop reason: HOSPADM

## 2023-05-25 RX ORDER — LORAZEPAM 1 MG/1
1 TABLET ORAL
Status: DISCONTINUED | OUTPATIENT
Start: 2023-05-28 | End: 2023-05-26

## 2023-05-25 RX ORDER — BENZTROPINE MESYLATE 1 MG/1
2 TABLET ORAL ONCE AS NEEDED
Status: DISCONTINUED | OUTPATIENT
Start: 2023-05-25 | End: 2023-05-31 | Stop reason: HOSPADM

## 2023-05-25 RX ORDER — ACETAMINOPHEN 325 MG/1
650 TABLET ORAL EVERY 6 HOURS PRN
Status: DISCONTINUED | OUTPATIENT
Start: 2023-05-25 | End: 2023-05-31 | Stop reason: HOSPADM

## 2023-05-25 RX ORDER — LORAZEPAM 2 MG/1
2 TABLET ORAL
Status: DISCONTINUED | OUTPATIENT
Start: 2023-05-26 | End: 2023-05-26

## 2023-05-25 RX ORDER — HYDROXYZINE 50 MG/1
50 TABLET, FILM COATED ORAL EVERY 6 HOURS PRN
Status: DISCONTINUED | OUTPATIENT
Start: 2023-05-25 | End: 2023-05-31 | Stop reason: HOSPADM

## 2023-05-25 RX ADMIN — Medication 100 MG: at 15:03

## 2023-05-25 RX ADMIN — CLONIDINE HYDROCHLORIDE 0.1 MG: 0.1 TABLET ORAL at 16:56

## 2023-05-25 RX ADMIN — Medication 2 TABLET: at 16:15

## 2023-05-25 RX ADMIN — Medication 1 TABLET: at 15:03

## 2023-05-25 RX ADMIN — LORAZEPAM 2 MG: 2 TABLET ORAL at 15:04

## 2023-05-25 NOTE — NURSING NOTE
Patient presents to intake and states that he and his mom had been talking the past few days about him coming back in to get help and he decided it was time to get help. He states that his depression has been bad again for the past three months and he has been drinking more and more for the past few months. Patient reports problems with his ex, financial problems, problems focusing and not being motivated to want to go to work and states that he has been feeling suicidal with no plan but last night had active thoughts. He also states that he drank last night. He has been drinking about a 6 pk  Of 12 oz beers a day for the past three months with no sobriety. He states that he had been doing goof and his woman ghosted him and he got upset and started back drinking. Anxiety and depression 8/10. Pt denies other drug use except occasional marijuana use.

## 2023-05-25 NOTE — NURSING NOTE
"Patient reports suicidal thoughts with no particulary plan but states there are too many plans to choose from. He has been having thoughts for the last 3 months but thoughts have worsened in the last week. He reports separation from wife last summer and since she left he has been uninsured. He has been treated for depression in the past with medication and therapy but was not able to afford to continue treatment after separation when he lost insurance. He has been daily drinking for 6 years- for the last 3.5 years he has been drinking 5-6 beers per day- from October 2022 to February 2023 he cut back drinking to once a week. Previously he was drinking vodka 1/2 gallon twice a week. He states that he has to drink to be able to fall asleep. He has missed work from Thursday through today because of drinking and need to get help with drinking and mood. He rates anxiety at 3/10, depression at 10/10, and craving at 0/10. He feels hopeless, helpless, worthless and powerless. PHQ-9 score: 17, SARATH-7 score: 12, CIWA score: 5. Patient has poor eye contact, minimizing symptoms reports in tremor, sweats or significant withdrawal. Pupils dilated, fine tremor noted, and palms moist. Reports little to eat today and reports some dizziness and floor looking \"fuzzy\". Snack obtained for patient. (BG 93 per admit labs). He denies HI, A/V hallucinations or delusions.  "

## 2023-05-25 NOTE — ED PROVIDER NOTES
"Subjective   History of Present Illness  28-year-old male who presents to the ED today for mental health evaluation.  He reports that he has been having intermittent suicidal ideations for the last 10 years but it has gotten worse over the last week.  He states it is because he \"got ghosted.\"  He denies an active plan.  He denies any prior suicide attempts.  He denies any homicidal ideations.  He states he does drink approximately 6 beers daily and his last drink was last night.  He denies any drug use.  He states his sleep has been normal but his appetite has been poor.  He denies any hallucinations.    History provided by:  Patient  Mental Health Problem  Presenting symptoms: depression and suicidal thoughts    Presenting symptoms: no hallucinations    Degree of incapacity (severity):  Moderate  Onset quality:  Gradual  Duration:  1 week  Timing:  Constant  Progression:  Worsening  Chronicity:  Recurrent  Context: alcohol use and stressful life event    Context: not drug abuse    Relieved by:  Nothing  Worsened by:  Nothing  Associated symptoms: appetite change    Associated symptoms: no insomnia    Risk factors: hx of mental illness    Risk factors: no hx of suicide attempts        Review of Systems   Constitutional: Positive for appetite change.   HENT: Negative.    Eyes: Negative.    Respiratory: Negative.    Cardiovascular: Negative.    Gastrointestinal: Negative.    Genitourinary: Negative.    Musculoskeletal: Negative.    Skin: Negative.    Neurological: Negative.    Psychiatric/Behavioral: Positive for dysphoric mood and suicidal ideas. Negative for hallucinations and sleep disturbance. The patient does not have insomnia.    All other systems reviewed and are negative.      Past Medical History:   Diagnosis Date   • Alcohol abuse    • Anxiety    • Depression    • Heart valve regurgitation    • Self-injurious behavior     from age [age]   • Suicidal thoughts    • Suicide attempt     states his had tried to " kill self multiple times. States he held knife few days ago but didnt do it       No Known Allergies    History reviewed. No pertinent surgical history.    Family History   Problem Relation Age of Onset   • Drug abuse Sister    • Suicide Attempts Sister    • Suicide Attempts Brother        Social History     Socioeconomic History   • Marital status:    • Number of children: 2   • Years of education: 12   • Highest education level: High school graduate   Tobacco Use   • Smoking status: Every Day     Packs/day: 0.50     Years: 12.00     Pack years: 6.00     Types: Cigarettes   • Smokeless tobacco: Never   • Tobacco comments:     Started using tobacco products at age 16   Vaping Use   • Vaping Use: Never used   Substance and Sexual Activity   • Alcohol use: Yes     Comment: a 6 pack a day.   • Drug use: Yes     Types: Marijuana, Other     Comment: ETOH   • Sexual activity: Defer     Partners: Female           Objective   Physical Exam  Vitals and nursing note reviewed.   Constitutional:       General: He is not in acute distress.     Appearance: Normal appearance.   HENT:      Head: Normocephalic and atraumatic.      Right Ear: External ear normal.      Left Ear: External ear normal.   Eyes:      Conjunctiva/sclera: Conjunctivae normal.      Pupils: Pupils are equal, round, and reactive to light.   Cardiovascular:      Rate and Rhythm: Normal rate and regular rhythm.      Pulses: Normal pulses.      Heart sounds: Normal heart sounds.   Pulmonary:      Effort: Pulmonary effort is normal.      Breath sounds: Normal breath sounds.   Abdominal:      General: Bowel sounds are normal.      Palpations: Abdomen is soft.   Musculoskeletal:         General: Normal range of motion.      Cervical back: Normal range of motion and neck supple.   Skin:     General: Skin is warm and dry.      Capillary Refill: Capillary refill takes less than 2 seconds.   Neurological:      General: No focal deficit present.      Mental Status:  He is alert and oriented to person, place, and time.   Psychiatric:         Mood and Affect: Mood is depressed.         Speech: Speech normal.         Behavior: Behavior normal. Behavior is cooperative.         Thought Content: Thought content includes suicidal ideation. Thought content does not include homicidal ideation. Thought content does not include suicidal plan.         Procedures        Results for orders placed or performed during the hospital encounter of 05/25/23   COVID-19 and FLU A/B PCR - Swab, Nasopharynx    Specimen: Nasopharynx; Swab   Result Value Ref Range    COVID19 Not Detected Not Detected - Ref. Range    Influenza A PCR Not Detected Not Detected    Influenza B PCR Not Detected Not Detected   Comprehensive Metabolic Panel    Specimen: Arm, Right; Blood   Result Value Ref Range    Glucose 93 65 - 99 mg/dL    BUN 8 6 - 20 mg/dL    Creatinine 0.72 (L) 0.76 - 1.27 mg/dL    Sodium 136 136 - 145 mmol/L    Potassium 4.4 3.5 - 5.2 mmol/L    Chloride 98 98 - 107 mmol/L    CO2 27.3 22.0 - 29.0 mmol/L    Calcium 9.9 8.6 - 10.5 mg/dL    Total Protein 7.8 6.0 - 8.5 g/dL    Albumin 4.7 3.5 - 5.2 g/dL    ALT (SGPT) 18 1 - 41 U/L    AST (SGOT) 24 1 - 40 U/L    Alkaline Phosphatase 96 39 - 117 U/L    Total Bilirubin 0.4 0.0 - 1.2 mg/dL    Globulin 3.1 gm/dL    A/G Ratio 1.5 g/dL    BUN/Creatinine Ratio 11.1 7.0 - 25.0    Anion Gap 10.7 5.0 - 15.0 mmol/L    eGFR 126.0 >60.0 mL/min/1.73   Urinalysis With Microscopic If Indicated (No Culture) - Urine, Clean Catch    Specimen: Urine, Clean Catch   Result Value Ref Range    Color, UA Yellow Yellow, Straw    Appearance, UA Clear Clear    pH, UA >=9.0 (H) 5.0 - 8.0    Specific Gravity, UA 1.017 1.005 - 1.030    Glucose, UA Negative Negative    Ketones, UA Trace (A) Negative    Bilirubin, UA Negative Negative    Blood, UA Negative Negative    Protein, UA Trace (A) Negative    Leuk Esterase, UA Negative Negative    Nitrite, UA Negative Negative    Urobilinogen, UA 1.0  E.U./dL 0.2 - 1.0 E.U./dL   Ethanol    Specimen: Arm, Right; Blood   Result Value Ref Range    Ethanol <10 0 - 10 mg/dL    Ethanol % <0.010 %   Urine Drug Screen - Urine, Clean Catch    Specimen: Urine, Clean Catch   Result Value Ref Range    THC, Screen, Urine Negative Negative    Phencyclidine (PCP), Urine Negative Negative    Cocaine Screen, Urine Negative Negative    Methamphetamine, Ur Negative Negative    Opiate Screen Negative Negative    Amphetamine Screen, Urine Negative Negative    Benzodiazepine Screen, Urine Negative Negative    Tricyclic Antidepressants Screen Negative Negative    Methadone Screen, Urine Negative Negative    Barbiturates Screen, Urine Negative Negative    Oxycodone Screen, Urine Negative Negative    Propoxyphene Screen Negative Negative    Buprenorphine, Screen, Urine Negative Negative   Magnesium    Specimen: Arm, Right; Blood   Result Value Ref Range    Magnesium 1.9 1.6 - 2.6 mg/dL   CBC Auto Differential    Specimen: Arm, Right; Blood   Result Value Ref Range    WBC 5.31 3.40 - 10.80 10*3/mm3    RBC 5.00 4.14 - 5.80 10*6/mm3    Hemoglobin 16.0 13.0 - 17.7 g/dL    Hematocrit 45.9 37.5 - 51.0 %    MCV 91.8 79.0 - 97.0 fL    MCH 32.0 26.6 - 33.0 pg    MCHC 34.9 31.5 - 35.7 g/dL    RDW 11.9 (L) 12.3 - 15.4 %    RDW-SD 39.8 37.0 - 54.0 fl    MPV 9.5 6.0 - 12.0 fL    Platelets 282 140 - 450 10*3/mm3    Neutrophil % 64.5 42.7 - 76.0 %    Lymphocyte % 22.4 19.6 - 45.3 %    Monocyte % 8.9 5.0 - 12.0 %    Eosinophil % 2.1 0.3 - 6.2 %    Basophil % 1.7 (H) 0.0 - 1.5 %    Immature Grans % 0.4 0.0 - 0.5 %    Neutrophils, Absolute 3.43 1.70 - 7.00 10*3/mm3    Lymphocytes, Absolute 1.19 0.70 - 3.10 10*3/mm3    Monocytes, Absolute 0.47 0.10 - 0.90 10*3/mm3    Eosinophils, Absolute 0.11 0.00 - 0.40 10*3/mm3    Basophils, Absolute 0.09 0.00 - 0.20 10*3/mm3    Immature Grans, Absolute 0.02 0.00 - 0.05 10*3/mm3    nRBC 0.0 0.0 - 0.2 /100 WBC   Green Top (Gel)   Result Value Ref Range    Extra Tube Hold  for add-ons.    Lavender Top   Result Value Ref Range    Extra Tube hold for add-on    Gold Top - SST   Result Value Ref Range    Extra Tube Hold for add-ons.    Light Blue Top   Result Value Ref Range    Extra Tube Hold for add-ons.          ED Course  ED Course as of 05/25/23 1906   Thu May 25, 2023   1430 Medically clear for psych [AH]      ED Course User Index  [AH] Farheen Mccauley PA                                           Medical Decision Making  36-year-old male who presents to the ED today for a mental health evaluation.  He was medically cleared for an evaluation.  Psychiatry was consulted who decided on inpatient admission.    Depression with suicidal ideation: complicated acute illness or injury  Amount and/or Complexity of Data Reviewed  Labs: ordered.          Final diagnoses:   Depression with suicidal ideation       ED Disposition  ED Disposition     ED Disposition   DC/Transfer to Behavioral Health    Condition   Stable    Comment   --             No follow-up provider specified.       Medication List      No changes were made to your prescriptions during this visit.          Farheen Mccauley PA  05/25/23 1906

## 2023-05-25 NOTE — NURSING NOTE
Spoke with Dr Yao at this time regarding elevated Bp.    New order for Clonidine 0.1 mg po TID prn. [id number]    Patient updated on plan of care.

## 2023-05-25 NOTE — NURSING NOTE
Called and spoke to Dr. Yao. Instructed to ask the patient how did he or how would he hurt himself. Patient states with a gun.     Dr. Yao instructed staff to admit with routine orders sp3 and ativan detox protocol. [id number]

## 2023-05-26 LAB
QT INTERVAL: 394 MS
QTC INTERVAL: 416 MS

## 2023-05-26 PROCEDURE — 99223 1ST HOSP IP/OBS HIGH 75: CPT | Performed by: PSYCHIATRY & NEUROLOGY

## 2023-05-26 RX ORDER — VENLAFAXINE HYDROCHLORIDE 37.5 MG/1
37.5 CAPSULE, EXTENDED RELEASE ORAL
Status: DISCONTINUED | OUTPATIENT
Start: 2023-05-26 | End: 2023-05-27

## 2023-05-26 RX ADMIN — Medication 1 TABLET: at 10:21

## 2023-05-26 RX ADMIN — Medication 100 MG: at 10:21

## 2023-05-26 RX ADMIN — Medication 2 TABLET: at 10:21

## 2023-05-26 RX ADMIN — VENLAFAXINE HYDROCHLORIDE 37.5 MG: 37.5 CAPSULE, EXTENDED RELEASE ORAL at 11:34

## 2023-05-26 RX ADMIN — Medication 1 PATCH: at 10:22

## 2023-05-26 NOTE — PLAN OF CARE
Goal Outcome Evaluation:  Plan of Care Reviewed With: patient  Patient Agreement with Plan of Care: agrees  Consent Given to Review Plan with: Mother  Progress: no change  Outcome Evaluation: Therapist met with Patient to review care plan, social history, and aftercare recommendations; Patient agreeable.      Problem: Adult Behavioral Health Plan of Care  Goal: Plan of Care Review  Outcome: Ongoing, Progressing  Flowsheets (Taken 5/26/2023 1027)  Consent Given to Review Plan with: Mother  Progress: no change  Plan of Care Reviewed With: patient  Patient Agreement with Plan of Care: agrees  Outcome Evaluation:  • Therapist met with Patient to review care plan, social history, and aftercare recommendations  • Patient agreeable.  Goal: Patient-Specific Goal (Individualization)  Outcome: Ongoing, Progressing  Flowsheets  Taken 5/26/2023 1027 by Carmen Salcido MSW  Patient-Specific Goals (Include Timeframe): Identify 2-3 coping skills, address relapse prevention measures, complete aftercare plans, and deny SI/HI prior to discharge.  Individualized Care Needs: Therapist to offer 1-4 therapy sessions, aftercare planning, safety planning, family education, group therapy, and brief CBT/MI interventions.  Taken 5/26/2023 0931 by Carmen Salcido MSW  Patient Personal Strengths:  • resilient  • resourceful  • self-reliant  Patient Vulnerabilities:  • family/relationship conflict  • substance abuse/addiction  • poor impulse control  Taken 5/25/2023 1610 by Kerry Lara, RN  Anxieties, Fears or Concerns: anxiety regarding admit and other patients  Goal: Optimized Coping Skills in Response to Life Stressors  Outcome: Ongoing, Progressing  Flowsheets (Taken 5/26/2023 1027)  Optimized Coping Skills in Response to Life Stressors: making progress toward outcome  Intervention: Promote Effective Coping Strategies  Flowsheets (Taken 5/26/2023 1027)  Supportive Measures:  • active listening utilized  • counseling provided  •  goal-setting facilitated  • verbalization of feelings encouraged  Goal: Develops/Participates in Therapeutic [place] to Support Successful Transition  Outcome: Ongoing, Progressing  Flowsheets (Taken 5/26/2023 1027)  Develops/Participates in Therapeutic [place] to Support Successful Transition: making progress toward outcome  Intervention: Foster Therapeutic [place]  Flowsheets (Taken 5/26/2023 1027)  Trust Relationship/Rapport:  • care explained  • questions encouraged  • choices provided  • reassurance provided  • thoughts/feelings acknowledged  • emotional support provided  • empathic listening provided  • questions answered  Intervention: Mutually Develop Transition Plan  Flowsheets  Taken 5/26/2023 1027 by Carmen Salcido MSW  Outpatient/Agency/Support Group Needs:  • outpatient counseling  • outpatient medication management  Discharge Coordination/Progress:  • Therapist met with Patient to complete discharge needs assessment  • Patient agreeable.  Transition Support: follow-up care discussed  Anticipated Discharge Disposition: home with family  Current Discharge Risk:  • psychiatric illness  • substance use/abuse  Concerns to be Addressed:  • coping/stress  • mental health  Readmission Within the Last 30 Days: no previous admission in last 30 days  Patient/Family Anticipated Services at Transition:  • outpatient care  • mental health services  Patient's Choice of Community Agency(s): [facility]  Offered/Gave Vendor List: no  Taken 5/25/2023 1610 by Kerry Lara, RN  Transportation Anticipated: family or friend will provide  Transportation Concerns: none  Patient/Family Anticipates Transition to: home with family     DATA: Therapist met individually with patient this date to introduce role and to discuss hospitalization expectations. Patient agreeable.     Patient signed consent for his mother, Melisa Salgado. This therapist called and spoke with her today. She states that Patient has been  "battling depression for several years now. She states that last year they caught him going into the woods with an electrical cord with plans to hang himself. She states in the past few days Patient has been very tearful, and asking his family for a gun to \"stop the pain\". She reports that he has missed work all week due to his depression. This therapist completed safety planning with patient's mother. Therapist advised that all weapon, harmful objects, and medications in the home be secured. She stated understanding.     Patient is requesting follow up with [facility].     Clinical Maneuvering/Intervention:     Therapist assisted patient in processing above session content; acknowledged and normalized patient’s thoughts, feelings, and concerns.  Discussed the therapist/patient relationship and explain the parameters and limitations of relative confidentiality.  Also discussed the importance of active participation, and honesty to the treatment process.  Encouraged the patient to discuss/vent their feelings, frustrations, and fears concerning their ongoing medical issues and validated their feelings.     Discussed the importance of finding enjoyable activities and coping skills that the patient can engage in a regular basis. Discussed healthy coping skills such as distraction, self love, grounding, thought challenges/reframing, etc.  Provided patient with list of healthy coping skills this date. Discussed the importance of medication compliance.  Praised the patient for seeking help and spent the majority of the session building rapport.       Allowed patient to freely discuss issues without interruption or judgment. Provided safe, confidential environment to facilitate the development of positive therapeutic relationship and encourage open, honest communication.      Therapist addressed discharge safety planning this date. Assisted patient in identifying risk factors which would indicate the need for higher " "level of care after discharge;  including thoughts to harm self or others and/or self-harming behavior. Encouraged patient to call 911, or present to the nearest emergency room should any of these events occur. Discussed crisis intervention services and means to access.  Encouraged securing any objects of harm.       Therapist completed integrated summary, treatment plan, and initiated social history this date.  Therapist is strongly encouraging family involvement in treatment.       ASSESSMENT: Antony Salgado is a 28 year old  male living in [facility] with his mother and brother. Patient is a highschool graduate and currently works for a tree cutting business. Patient was admitted for SI. He states that he has been having SI for the past three months, but that these thoughts have been much worse this past week. He identified a lot of financial stressors as he reports giving \"a lot of money\" to his ex for their kids. Because of these financial stressors he has not been able to pay for outpatient care due to a lack of insurance. He reports excessive drinking to help with his depression and anxiety. Patient minimizes his problems, but then will make statements about not wanting to be alive. Patient reports feeling hopeless. He is guarded and makes poor eye contact. He states that he was here in 2017, and that he \"lied my way out of here\". Today Patient states that he knows that he needs to be here, but that he would rather die than be here. This therapist offered emotional support and encouragement.     PLAN:       Patient to remain hospitalized this date.     Treatment team will focus efforts on stabilizing patient's acute symptoms while providing education on healthy coping and crisis management to reduce hospitalizations.   Patient requires daily psychiatrist evaluation and 24/7 nursing supervision to promote patient  safety.     Therapist will offer 1-4 individual sessions, 1 therapy group daily, " family education, and appropriate referral.    Therapist recommends intensive substance use treatment. Patient is not agreeable at this time. He reports that therapy was helpful in the past.

## 2023-05-26 NOTE — PLAN OF CARE
Goal Outcome Evaluation:  Plan of Care Reviewed With: patient  Patient Agreement with Plan of Care: agrees     Progress: no change  Outcome Evaluation: Pt has been calm and cooperative, isolates to room for most of the shift. Pt rates anxiety 0, depression 10, states sleep and appetite are poor. Pt states he is still suicidal with no plan, denies HI/AVH. Pt voices no complaints to this RN.

## 2023-05-26 NOTE — H&P
INITIAL PSYCHIATRIC HISTORY & PHYSICAL    Patient Identification:  Name:  Antony Salgado  Age:  28 y.o.  Sex:  male  :  1995  MRN:  [id number]   Visit Number:  [id number]  Primary Care Physician:  Provider, No Known    SUBJECTIVE    CC/Focus of Exam: depression, SI    HPI: Antony Salgado is a 28 y.o. male who was admitted on 2023 with complaints of worsening depression, loss of loved ones, suicidal ideation.     Patient reports worsening depression, with symptoms of low mood, low energy, low motivation, poor concentration, high anxiety, anhedonia, hopelessness, worthlessness, insomnia, and SI.  Symptoms are severe, persistent, present in multiple settings, worse in the last three months, worse by interpersonal stressors, improved by nothing.    Lost girlfriend & stepdad recently.    PAST PSYCHIATRIC HX:  Dx: depression  IP: one previous admission here from 2017-2017  OP: denied  Current meds: denied  Previous meds: anti-depressants  SH/SI/SA: denied/intermittent/denied  Trauma: recent loss of loved ones    SUBSTANCE USE HX:  Drinks a six-pack of beer every night. Denies use of illicit drugs or THC.  Admission UDS negative    SOCIAL HX:  Born in [place], there until 6y. Lived in [place] since then  Works in tree business    FAMILY HX:    Family History   Problem Relation Age of Onset   • Drug abuse Sister    • Suicide Attempts Sister    • Suicide Attempts Brother        Past Medical History:   Diagnosis Date   • Alcohol abuse    • Anxiety    • Depression    • Heart valve regurgitation    • Self-injurious behavior     from age [age]   • Suicidal thoughts    • Suicide attempt     states his had tried to kill self multiple times. States he held knife few days ago but didnt do it       History reviewed. No pertinent surgical history.    No medications prior to admission.         ALLERGIES:  Patient has no known allergies.    Temp:  [97.5 °F (36.4 °C)-98.5 °F (36.9 °C)] 97.8 °F (36.6 °C)  Heart Rate:   [65-81] 65  Resp:  [16-18] 18  BP: (109-169)/() 125/73    REVIEW OF SYSTEMS:  Review of Systems   Psychiatric/Behavioral: Positive for dysphoric mood, sleep disturbance and suicidal ideas. The patient is nervous/anxious.    All other systems reviewed and are negative.       OBJECTIVE    PHYSICAL EXAM:  Physical Exam  Vitals and nursing note reviewed.   Constitutional:       Appearance: He is well-developed.   HENT:      Head: Normocephalic and atraumatic.      Right Ear: External ear normal.      Left Ear: External ear normal.      Nose: Nose normal.   Eyes:      Pupils: Pupils are equal, round, and reactive to light.   Pulmonary:      Effort: Pulmonary effort is normal. No respiratory distress.      Breath sounds: Normal breath sounds.   Abdominal:      General: There is no distension.      Palpations: Abdomen is soft.   Musculoskeletal:         General: No deformity. Normal range of motion.      Cervical back: Normal range of motion and neck supple.   Skin:     General: Skin is warm.      Findings: No rash.   Neurological:      Mental Status: He is alert and oriented to person, place, and time.      Coordination: Coordination normal.         MENTAL STATUS EXAM:   Hygiene:   good  Cooperation:  Guarded  Eye Contact:  Fair  Psychomotor Behavior:  Appropriate  Affect:  Restricted  Hopelessness: 7  Speech:  Normal  Thought Process: Goal directed and Linear  Thought Content:  Normal  Suicidal:  Suicidal Ideation and Suicidal plan  Homicidal:  None  Hallucinations:  None  Delusion:  None  Memory:  Intact  Orientation:  Person, Place, Time and Situation  Reliability:  fair  Insight:  Fair  Judgment:  Fair  Impulse Control:  Fair      Imaging Results (Last 24 Hours)     ** No results found for the last 24 hours. **           Lab Results   Component Value Date    GLUCOSE 93 05/25/2023    BUN 8 05/25/2023    CREATININE 0.72 (L) 05/25/2023    EGFRIFNONA 101 09/15/2020    BCR 11.1 05/25/2023    CO2 27.3 05/25/2023     CALCIUM 9.9 05/25/2023    ALBUMIN 4.7 05/25/2023    AST 24 05/25/2023    ALT 18 05/25/2023       Lab Results   Component Value Date    WBC 5.31 05/25/2023    HGB 16.0 05/25/2023    HCT 45.9 05/25/2023    MCV 91.8 05/25/2023     05/25/2023       ECG/EMG Results (most recent)     Procedure Component Value Units Date/Time    ECG 12 Lead Other [[id number]] Collected: 05/25/23 1547     Updated: 05/25/23 1549     QT Interval 394 ms      QTC Interval 416 ms     Narrative:      Test Reason : Baseline Cardiac Status~  Blood Pressure :   */*   mmHG  Vent. Rate :  67 BPM     Atrial Rate :  67 BPM     P-R Int : 140 ms          QRS Dur :  80 ms      QT Int : 394 ms       P-R-T Axes :  31  27  25 degrees     QTc Int : 416 ms    Normal sinus rhythm  Normal ECG  No previous ECGs available    Referred By: JOSEPH           Confirmed By:            Brief Urine Lab Results  (Last result in the past 365 days)      Color   Clarity   Blood   Leuk Est   Nitrite   Protein   CREAT   Urine HCG        05/25/23 1304 Yellow   Clear   Negative   Negative   Negative   Trace                 Last Urine Toxicity         Latest Ref Rng & Units 5/25/2023 1/6/2017   LAST URINE TOXICITY RESULTS   Amphetamine, Urine Qual Negative Negative   Negative     Barbiturates Screen, Urine Negative Negative   Negative     Benzodiazepine Screen, Urine Negative Negative   Negative     Buprenorphine, Screen, Urine Negative Negative      Cocaine Screen, Urine Negative Negative   Negative     Methadone Screen , Urine Negative Negative   Negative     Methamphetamine, Ur Negative Negative                   Chart, notes, vitals, labs personally reviewed.  Outside [facility] report requested, reviewed, no controlled meds filled in [place] over the last year  UDS results: negative  EKG tracing personally reviewed, interpreted as normal sinus rhythm, QTc interval 416  Consulted with patient's therapist regarding clinical history and treatment plan    ASSESSMENT &  PLAN:    Suicidal Ideation  -SI with plan  -Admit for crisis stabilization  -SP3    Major depressive disorder, severe, recurrent, without psychosis  -Begin Effexor XR 37.5mg daily  -We will establish outpatient care following hospitalization    Alcohol use disorder, moderate, dependence  -Pt drinking 6-pack per day, denies significant withdrawal symptoms  -Begin PRN portion of Ativan detox protocol  -Supplementary vitamins and comfort meds ordered  -We will encourage rehab or other intensive substance abuse treatment following discharge    Nicotine use disorder, severe, dependence  -21 mg daily patch ordered  -Encouraged cessation    The patient has been admitted for safety and stabilization.  Patient will be monitored for suicidality daily and maintained on Special Precautions Level 3 (q15 min checks) .  The patient will have individual and group therapy with a master's level therapist. A master treatment plan will be developed and agreed upon by the patient and his/her treatment team.  The patient's estimated length of stay in the hospital is 5-7 days.

## 2023-05-26 NOTE — PLAN OF CARE
Goal Outcome Evaluation:  Plan of Care Reviewed With: patient  Patient Agreement with Plan of Care: agrees     Progress: no change  Outcome Evaluation: Patient was calm and cooperative during asessment. Anxiety 0 [id number]. Reports SI with no plan. Denies HI/AVH.  Patient reports sleeping fine and eating as poor. Spent time in the day room during the evening interacting with his peers..

## 2023-05-27 PROCEDURE — 99232 SBSQ HOSP IP/OBS MODERATE 35: CPT | Performed by: PSYCHIATRY & NEUROLOGY

## 2023-05-27 RX ORDER — VENLAFAXINE HYDROCHLORIDE 75 MG/1
75 CAPSULE, EXTENDED RELEASE ORAL
Status: DISCONTINUED | OUTPATIENT
Start: 2023-05-28 | End: 2023-05-29

## 2023-05-27 RX ADMIN — Medication 2 TABLET: at 08:32

## 2023-05-27 RX ADMIN — Medication 1 TABLET: at 08:33

## 2023-05-27 RX ADMIN — Medication 100 MG: at 08:33

## 2023-05-27 RX ADMIN — VENLAFAXINE HYDROCHLORIDE 37.5 MG: 37.5 CAPSULE, EXTENDED RELEASE ORAL at 08:32

## 2023-05-27 NOTE — PLAN OF CARE
Goal Outcome Evaluation:  Plan of Care Reviewed With: patient  Patient Agreement with Plan of Care: agrees        Outcome Evaluation: Patient calm, cooperative, keeps to himself. Spent most of the evening reading in the dayroom. Rated anxiety 7, depression 10. Reported SI with no plan, agreed to seek out staff if thoughts increase. Denied HI and AVH.

## 2023-05-27 NOTE — PLAN OF CARE
Goal Outcome Evaluation:  Plan of Care Reviewed With: patient  Patient Agreement with Plan of Care: agrees     Progress: improving  Outcome Evaluation: pt. verbalizes was up & down through out night rates anxiety 6 rates depression 10 denies s/i denies  h/i denies a/v/h he denies c/w mininal interaction noted with peers .

## 2023-05-27 NOTE — PROGRESS NOTES
" Inpatient Psych Progress Note     Clinician: Jose Ramon Liu MD  Admission Date: 5/25/2023  09:40 EDT 05/27/23    Behavioral Health Treatment Plan and Problem List: I have reviewed and approved the Behavioral Health Treatment Plan and Problem list.    Allergies  No Known Allergies    Hospital Day: 2 days      Assessment completed within view of staff    History  CC/clinical focus: depression, SI    Interval HPI: Patient seen and evaluated by me.  Chart reviewed.  Patient complaining of profound and severe depressive symptoms rating his level of depression at 10 out of 10.  He is continuing to experience some suicidal ideation but says he feels safe in the hospital.  Med Compliant.  ROS otherwise as below.      Interval Review of Systems:   General ROS: negative for - fever or malaise  Endocrine ROS: negative for - palpitations  Respiratory ROS: no cough, shortness of breath, or wheezing  Cardiovascular ROS: no chest pain or dyspnea on exertion  Gastrointestinal ROS: no abdominal pain,no black or bloody stools    /89 (BP Location: Right arm, Patient Position: Sitting)   Pulse 73   Temp 99 °F (37.2 °C) (Temporal)   Resp 18   Ht 170.2 cm (67\")   Wt 61.7 kg (136 lb)   SpO2 97%   BMI 21.30 kg/m²     Mental Status Exam  Mood: depressed  Affect: dysphoric   Thought Processes: linear  Thought Content: No auditory or visual hallucinations  Suicidal Thoughts: moderate  Suicidal Plan/Intent: denies  Hopelesness:Severe  Homicidal Thoughts:  denies      Medical Decision Making:   Labs:     Lab Results (last 24 hours)     ** No results found for the last 24 hours. **            Radiology:     Imaging Results (Last 24 Hours)     ** No results found for the last 24 hours. **            EKG:     ECG/EMG Results (most recent)     Procedure Component Value Units Date/Time    ECG 12 Lead Other [[id number]] Collected: 05/25/23 1547     Updated: 05/26/23 1001     QT Interval 394 ms      QTC Interval 416 ms     Narrative:      " Test Reason : Baseline Cardiac Status~  Blood Pressure :   */*   mmHG  Vent. Rate :  67 BPM     Atrial Rate :  67 BPM     P-R Int : 140 ms          QRS Dur :  80 ms      QT Int : 394 ms       P-R-T Axes :  31  27  25 degrees     QTc Int : 416 ms    Normal sinus rhythm  Normal ECG  No previous ECGs available  Confirmed by Genevieve Benson ([id number]) on 5/26/2023 9:59:50 AM    Referred By: JOSEPH           Confirmed By: Genevieve Benson           Medications:  B-complex with vitamin C, 2 tablet, Oral, Daily  multivitamin with minerals, 1 tablet, Oral, Daily  nicotine, 1 patch, Transdermal, Q24H  thiamine, 100 mg, Oral, Daily  venlafaxine XR, 37.5 mg, Oral, Daily With Breakfast           All medications reviewed.      Assessment and Plan:      Major depressive disorder, severe, recurrent, without psychosis  -Increase Effexor XR to 75mg Daily  -We will establish outpatient care following hospitalization     Alcohol use disorder, moderate, dependence  -Pt drinking 6-pack per day, denies significant withdrawal symptoms  -Begin PRN portion of Ativan detox protocol  -Supplementary vitamins and comfort meds ordered  -We will encourage rehab or other intensive substance abuse treatment following discharge     Nicotine use disorder, severe, dependence  -21 mg daily patch ordered  -Encouraged cessation       Continue hospitalization for safety and stabilization.  Continue current level of Special Precautions (q15 minute checks).

## 2023-05-28 PROCEDURE — 99232 SBSQ HOSP IP/OBS MODERATE 35: CPT | Performed by: PSYCHIATRY & NEUROLOGY

## 2023-05-28 RX ADMIN — Medication 1 TABLET: at 08:32

## 2023-05-28 RX ADMIN — Medication 100 MG: at 08:32

## 2023-05-28 RX ADMIN — Medication 2 TABLET: at 08:32

## 2023-05-28 RX ADMIN — VENLAFAXINE HYDROCHLORIDE 75 MG: 75 CAPSULE, EXTENDED RELEASE ORAL at 08:32

## 2023-05-28 NOTE — PLAN OF CARE
Problem: Adult Behavioral Health Plan of Care  Goal: Plan of Care Review  Outcome: Ongoing, Progressing  Flowsheets  Taken 5/28/2023 1410 by Nader Madsen, RN  Outcome Evaluation: PATIENT VERBALIZES SEVERE ANXIETY AND DEPRESSION AS ONLY PROBLEMS THIS SHIFT. PATIENT IS AOX3, COOPERATIVE WITH NO S/S OF ACUTE DISTRESS NOTED. NNO NOTED.  Taken 5/28/2023 0800 by Nader Madsen, RN  Plan of Care Reviewed With: patient  Patient Agreement with Plan of Care: agrees  Taken 5/28/2023 0138 by Mike Venegas RN  Progress: improving   Goal Outcome Evaluation:  Plan of Care Reviewed With: patient  Patient Agreement with Plan of Care: agrees        Outcome Evaluation: PATIENT VERBALIZES SEVERE ANXIETY AND DEPRESSION AS ONLY PROBLEMS THIS SHIFT. PATIENT IS AOX3, COOPERATIVE WITH NO S/S OF ACUTE DISTRESS NOTED. NNO NOTED.

## 2023-05-28 NOTE — PROGRESS NOTES
"      Inpatient Psych Progress Note     Clinician: Jose Ramon Liu MD  Admission Date: 5/25/2023  07:38 EDT 05/28/23    Behavioral Health Treatment Plan and Problem List: I have reviewed and approved the Behavioral Health Treatment Plan and Problem list.    Allergies  No Known Allergies    Hospital Day: 3 days      Assessment completed within view of staff    History  CC/clinical focus: depression, SI    Interval HPI: Patient seen and evaluated by me.  Chart reviewed. Patient laying in bed during visit today. States that he did not sleep well last night due to people slamming doors. He continues to c/o ongoing severe depressive symptoms and again rates his level of depression as a 10/10. Denies any SI/HI today. Tolerating medications well without side effects.   Med Compliant.  ROS otherwise as below.      Interval Review of Systems:   General ROS: negative for - fever or malaise  Endocrine ROS: negative for - palpitations  Respiratory ROS: no cough, shortness of breath, or wheezing  Cardiovascular ROS: no chest pain or dyspnea on exertion  Gastrointestinal ROS: no abdominal pain,no black or bloody stools    /83 (BP Location: Right arm, Patient Position: Sitting)   Pulse 68   Temp 97.2 °F (36.2 °C) (Temporal)   Resp 18   Ht 170.2 cm (67\")   Wt 61.7 kg (136 lb)   SpO2 96%   BMI 21.30 kg/m²     Mental Status Exam  Mood: depressed  Affect: dysphoric   Thought Processes: linear, logical, and goal directed  Thought Content: normal  Hallucinations: no  Suicidal Thoughts: denies  Suicidal Plan/Intent: denies  Hopelesness:Severe  Homicidal Thoughts:  denies      Medical Decision Making:   Labs:     Lab Results (last 24 hours)     ** No results found for the last 24 hours. **            Radiology:     Imaging Results (Last 24 Hours)     ** No results found for the last 24 hours. **            EKG:     ECG/EMG Results (most recent)     Procedure Component Value Units Date/Time    ECG 12 Lead Other [[id number]] " Collected: 05/25/23 1547     Updated: 05/26/23 1001     QT Interval 394 ms      QTC Interval 416 ms     Narrative:      Test Reason : Baseline Cardiac Status~  Blood Pressure :   */*   mmHG  Vent. Rate :  67 BPM     Atrial Rate :  67 BPM     P-R Int : 140 ms          QRS Dur :  80 ms      QT Int : 394 ms       P-R-T Axes :  31  27  25 degrees     QTc Int : 416 ms    Normal sinus rhythm  Normal ECG  No previous ECGs available  Confirmed by Genevieve Benson ([id number]) on 5/26/2023 9:59:50 AM    Referred By: JOSEPH           Confirmed By: Genevieve Benson           Medications:  B-complex with vitamin C, 2 tablet, Oral, Daily  multivitamin with minerals, 1 tablet, Oral, Daily  nicotine, 1 patch, Transdermal, Q24H  thiamine, 100 mg, Oral, Daily  venlafaxine XR, 75 mg, Oral, Daily With Breakfast           All medications reviewed.      Assessment and Plan:      Major depressive disorder, severe, recurrent, without psychosis  - Continue Effexor XR 75mg Daily  - We will establish outpatient care following hospitalization     Alcohol use disorder, moderate, dependence  - Pt drinking 6-pack per day, denies significant withdrawal symptoms  - Began PRN portion of Ativan detox protocol  - Supplementary vitamins and comfort meds ordered  - We will encourage rehab or other intensive substance abuse treatment following discharge     Nicotine use disorder, severe, dependence  - 21 mg daily patch ordered  - Encouraged cessation      Continue hospitalization for safety and stabilization.  Continue current level of Special Precautions (q15 minute checks).        This note was generated by a scribe, Jacoby Aguayo. The work documented in this note was completed, reviewed, and approved by the attending psychiatrist as designated Dr. Jose Ramon Liu electronic signature.     I, Jose Ramon Liu MD, personally performed the services described in this documentation as scribed by the above named individual and is both accurate and complete.

## 2023-05-28 NOTE — PLAN OF CARE
Goal Outcome Evaluation:  Plan of Care Reviewed With: patient  Patient Agreement with Plan of Care: agrees  Consent Given to Review Plan with: Mother  Progress: improving              Pt was calm and cooperative during assessment, though guarded. Pt remained in his room for most of the evening only coming out for vital signs and a snack. Pt did not socialize with anyone else. Pt rated anxiety 7/10 and depression 10/10 but denied SI, HI, and AVH.

## 2023-05-29 PROCEDURE — 99232 SBSQ HOSP IP/OBS MODERATE 35: CPT | Performed by: PSYCHIATRY & NEUROLOGY

## 2023-05-29 RX ORDER — VENLAFAXINE HYDROCHLORIDE 150 MG/1
150 CAPSULE, EXTENDED RELEASE ORAL
Status: DISCONTINUED | OUTPATIENT
Start: 2023-05-30 | End: 2023-05-31 | Stop reason: HOSPADM

## 2023-05-29 RX ADMIN — VENLAFAXINE HYDROCHLORIDE 75 MG: 75 CAPSULE, EXTENDED RELEASE ORAL at 09:03

## 2023-05-29 RX ADMIN — Medication 1 TABLET: at 09:03

## 2023-05-29 RX ADMIN — Medication 2 TABLET: at 09:03

## 2023-05-29 RX ADMIN — Medication 100 MG: at 09:03

## 2023-05-29 NOTE — PROGRESS NOTES
" Inpatient Psych Progress Note     Clinician: Jose Ramon Liu MD  Admission Date: 5/25/2023  10:28 EDT 05/29/23    Behavioral Health Treatment Plan and Problem List: I have reviewed and approved the Behavioral Health Treatment Plan and Problem list.    Allergies  No Known Allergies    Hospital Day: 4 days      Assessment completed within view of staff    History  CC/clinical focus: depression, SI    Interval HPI: Patient seen and evaluated by me.  Chart reviewed.   Patient rates  level of depression (subjectively) at a   10/10.   Patient tolerating meds okay.  Denies side effects.  Med Compliant.  ROS otherwise as below.      Interval Review of Systems:   General ROS: negative for - fever or malaise  Endocrine ROS: negative for - palpitations  Respiratory ROS: no cough, shortness of breath, or wheezing  Cardiovascular ROS: no chest pain or dyspnea on exertion  Gastrointestinal ROS: no abdominal pain,no black or bloody stools    /86 (BP Location: Right arm, Patient Position: Sitting)   Pulse 76   Temp 97.9 °F (36.6 °C) (Temporal)   Resp 18   Ht 170.2 cm (67\")   Wt 61.7 kg (136 lb)   SpO2 98%   BMI 21.30 kg/m²     Mental Status Exam  Mood: depressed  Affect: mood-congruent   Thought Processes: linear  Thought Content: negativistic  Hallucinations: no  Suicidal Thoughts: denies  Suicidal Plan/Intent: denies  Hopelesness:Severe  Homicidal Thoughts:  denies      Medical Decision Making:   Labs:     Lab Results (last 24 hours)     ** No results found for the last 24 hours. **            Radiology:     Imaging Results (Last 24 Hours)     ** No results found for the last 24 hours. **            EKG:     ECG/EMG Results (most recent)     Procedure Component Value Units Date/Time    ECG 12 Lead Other [[id number]] Collected: 05/25/23 1547     Updated: 05/26/23 1001     QT Interval 394 ms      QTC Interval 416 ms     Narrative:      Test Reason : Baseline Cardiac Status~  Blood Pressure :   */*   mmHG  Vent. Rate :  " 67 BPM     Atrial Rate :  67 BPM     P-R Int : 140 ms          QRS Dur :  80 ms      QT Int : 394 ms       P-R-T Axes :  31  27  25 degrees     QTc Int : 416 ms    Normal sinus rhythm  Normal ECG  No previous ECGs available  Confirmed by Genevieve Benson ([id number]) on 5/26/2023 9:59:50 AM    Referred By: JOSEPH           Confirmed By: Genevieve Benson           Medications:  B-complex with vitamin C, 2 tablet, Oral, Daily  multivitamin with minerals, 1 tablet, Oral, Daily  nicotine, 1 patch, Transdermal, Q24H  thiamine, 100 mg, Oral, Daily  venlafaxine XR, 75 mg, Oral, Daily With Breakfast           All medications reviewed.      Assessment and Plan:    Major depressive disorder, severe, recurrent, without psychosis  - Increase Effexor XR to 150 mg daily  - We will establish outpatient care following hospitalization     Alcohol use disorder, moderate, dependence  - Pt drinking 6-pack per day, denies significant withdrawal symptoms  - Began PRN portion of Ativan detox protocol  - Supplementary vitamins and comfort meds ordered  - We will encourage rehab or other intensive substance abuse treatment following discharge     Nicotine use disorder, severe, dependence  - 21 mg daily patch ordered  - Encouraged cessation      Continue hospitalization for safety and stabilization.  Continue current level of Special Precautions (q15 minute checks).

## 2023-05-29 NOTE — PLAN OF CARE
Problem: Adult Behavioral Health Plan of Care  Goal: Plan of Care Review  5/29/2023 1245 by Nader Madsen, RN  Outcome: Ongoing, Progressing  Flowsheets  Taken 5/29/2023 1245 by Nader Madsen, RN  Outcome Evaluation: PATIENT VERBALIZES MODERATE ANXIETY AND DEPRESSION AS ONLYPROBLEMS THIS SHIFT. NO S/S OF ACUTE DISTRESS NOTED. NEW ORDERS: EFFEXOR 150MG  Taken 5/29/2023 0837 by Nader Madsen, RN  Plan of Care Reviewed With: patient  Patient Agreement with Plan of Care: agrees  Taken 5/28/2023 0138 by Mike Venegas, RN  Progress: improving   Goal Outcome Evaluation:  Plan of Care Reviewed With: patient  Patient Agreement with Plan of Care: agrees        Outcome Evaluation: PATIENT VERBALIZES MODERATE ANXIETY AND DEPRESSION AS ONLYPROBLEMS THIS SHIFT. NO S/S OF ACUTE DISTRESS NOTED. NEW ORDERS: EFFEXOR 150MG

## 2023-05-29 NOTE — PLAN OF CARE
Goal Outcome Evaluation:  Plan of Care Reviewed With: patient  Patient Agreement with Plan of Care: agrees                Pt states anxiety 7, depression 10. Pt is calm and cooperative with staff, med compliant

## 2023-05-30 PROCEDURE — 99232 SBSQ HOSP IP/OBS MODERATE 35: CPT | Performed by: PSYCHIATRY & NEUROLOGY

## 2023-05-30 RX ADMIN — Medication 1 TABLET: at 09:09

## 2023-05-30 RX ADMIN — Medication 100 MG: at 09:09

## 2023-05-30 RX ADMIN — Medication 2 TABLET: at 09:09

## 2023-05-30 RX ADMIN — TRAZODONE HYDROCHLORIDE 50 MG: 50 TABLET ORAL at 22:07

## 2023-05-30 RX ADMIN — VENLAFAXINE HYDROCHLORIDE 150 MG: 150 CAPSULE, EXTENDED RELEASE ORAL at 09:09

## 2023-05-30 NOTE — PROGRESS NOTES
"INPATIENT PSYCHIATRIC PROGRESS NOTE    Name:  Antony Salgado  :  1995  MRN:  [id number]  Visit Number:  [id number]  Length of stay:  5    SUBJECTIVE  CC/Focus of Exam: Depression, Alcohol use disorder    INTERVAL HISTORY:  Patient is seen for follow up, patient reported feeling better with his mood, rates his anxiety and depression as minimal, denies significant withdrawals, tolerating detox. Patient stated he is tolerating meds and benefiting from meds.Patient denies suicidal or homicidal ideations, denies auditory and visual hallucinations.  Depression rating 1/10  Anxiety rating 1/10  Sleep: fair  Withdrawal sx: denies  Cravin/10    Review of Systems   Psychiatric/Behavioral: The patient is nervous/anxious.    All other systems reviewed and are negative.      OBJECTIVE    Temp:  [97.4 °F (36.3 °C)-97.8 °F (36.6 °C)] 97.8 °F (36.6 °C)  Heart Rate:  [59-77] 77  Resp:  [17-18] 18  BP: (130-158)/(84-87) 130/84    MENTAL STATUS EXAM:  Appearance: Casually dressed, good hygeine.   Cooperation: Cooperative  Psychomotor: No psychomotor agitation/retardation, No EPS, No motor tics  Speech: normal rate, amount.  Mood: \"I am ok now\"   Affect: congruent, appropriate  Thought Content: goal directed, no delusional material present  Thought process: linear, organized.  Suicidality: No SI  Homicidality: No HI  Perception: No AH/VH  Insight: fair   Judgment: fair    Lab Results (last 24 hours)     ** No results found for the last 24 hours. **             Imaging Results (Last 24 Hours)     ** No results found for the last 24 hours. **             ECG/EMG Results (most recent)     Procedure Component Value Units Date/Time    ECG 12 Lead Other [[id number]] Collected: 23 1547     Updated: 23 1001     QT Interval 394 ms      QTC Interval 416 ms     Narrative:      Test Reason : Baseline Cardiac Status~  Blood Pressure :   */*   mmHG  Vent. Rate :  67 BPM     Atrial Rate :  67 BPM     P-R Int : 140 ms          " QRS Dur :  80 ms      QT Int : 394 ms       P-R-T Axes :  31  27  25 degrees     QTc Int : 416 ms    Normal sinus rhythm  Normal ECG  No previous ECGs available  Confirmed by Genevieve Benson ([id number]) on 5/26/2023 9:59:50 AM    Referred By: JOSEPH           Confirmed By: Genevieve Benson           ALLERGIES: Patient has no known allergies.      Current Facility-Administered Medications:   •  acetaminophen (TYLENOL) tablet 650 mg, 650 mg, Oral, Q6H PRN, Lainey Yao MD  •  aluminum-magnesium hydroxide-simethicone (MAALOX MAX) 400-400-40 MG/5ML suspension 15 mL, 15 mL, Oral, Q6H PRN, Lainey Yao MD  •  B-complex with vitamin C tablet 2 tablet, 2 tablet, Oral, Daily, Lainey Yao MD, 2 tablet at 05/30/23 0909  •  benzonatate (TESSALON) capsule 100 mg, 100 mg, Oral, TID PRN, Lainey Yao MD  •  benztropine (COGENTIN) tablet 2 mg, 2 mg, Oral, Once PRN **OR** benztropine (COGENTIN) injection 1 mg, 1 mg, Intramuscular, Once PRN, Lainey Yao MD  •  cloNIDine (CATAPRES) tablet 0.1 mg, 0.1 mg, Oral, TID PRN, Lainey Yao MD, 0.1 mg at 05/25/23 1656  •  famotidine (PEPCID) tablet 20 mg, 20 mg, Oral, BID PRN, Lainey Yao MD  •  hydrOXYzine (ATARAX) tablet 50 mg, 50 mg, Oral, Q6H PRN, Lainey Yao MD  •  ibuprofen (ADVIL,MOTRIN) tablet 400 mg, 400 mg, Oral, Q6H PRN, Lainey Yao MD  •  loperamide (IMODIUM) capsule 2 mg, 2 mg, Oral, Q2H PRN, Lainey Yao MD  •  magnesium hydroxide (MILK OF MAGNESIA) suspension 10 mL, 10 mL, Oral, Daily PRN, Lainey Yao MD  •  multivitamin with minerals 1 tablet, 1 tablet, Oral, Daily, Lainey Yao MD, 1 tablet at 05/30/23 0909  •  nicotine (NICODERM CQ) 21 MG/24HR patch 1 patch, 1 patch, Transdermal, Q24H, Lainey Yao MD, 1 patch at 05/26/23 1022  •  ondansetron (ZOFRAN) tablet 4 mg, 4 mg, Oral, Q6H PRN, Lainey Yao MD  •  sodium chloride nasal spray 2 spray, 2 spray, Each Nare, PRN, Lainey Yao MD  •  thiamine (VITAMIN B-1) tablet 100 mg, 100 mg, Oral, Daily, Joseph  MD Lainey, 100 mg at 05/30/23 0909  •  traZODone (DESYREL) tablet 50 mg, 50 mg, Oral, Nightly PRN, Lainey Yao MD  •  venlafaxine XR (EFFEXOR-XR) 24 hr capsule 150 mg, 150 mg, Oral, Daily With Breakfast, Jose Ramon Liu MD, 150 mg at 05/30/23 0909    Reviewed chart, notes, vitals, labs and EKG personally    ASSESSMENT & PLAN:      Major depressive disorder recurrent severe  Effexor xr 150 mg po q daily     Alcohol use disorder   On ativan detox, tolerating the detox  Vitamin support    Nicotine use disorder severe dependence  Nicotine patch   Encouraged cessation  Special precautions: Special Precautions Level 3 (q15 min checks)     Behavioral Health Treatment Plan and Problem List: I have reviewed and approved the Behavioral Health Treatment Plan and Problem list.  The patient has had a chance to review and agrees with the treatment plan.     Clinician:  Bi Gooden MD  05/30/23  17:00 EDT

## 2023-05-30 NOTE — PLAN OF CARE
Problem: Adult Behavioral Health Plan of Care  Goal: Plan of Care Review  Outcome: Ongoing, Progressing  Flowsheets  Taken 5/30/2023 1459 by Nader Madsen, RN  Progress: improving  Plan of Care Reviewed With: patient  Outcome Evaluation: AOX3 WITH NO S/S OF ACUTE DISTRESS NOTED  Taken 5/30/2023 1438 by Carmen Salcido MSW  Patient Agreement with Plan of Care: agrees  Taken 5/30/2023 0708 by Nader Madsen, RN  Plan of Care Reviewed With: patient  Patient Agreement with Plan of Care: agrees   Goal Outcome Evaluation:  Plan of Care Reviewed With: patient  Patient Agreement with Plan of Care: agrees     Progress: improving  Outcome Evaluation: AOX3 WITH NO S/S OF ACUTE DISTRESS NOTED

## 2023-05-30 NOTE — PLAN OF CARE
Goal Outcome Evaluation:  Plan of Care Reviewed With: patient, mother  Patient Agreement with Plan of Care: agrees  Consent Given to Review Plan with: Mother  Progress: improving  Outcome Evaluation: Therapist met with Patient to review treatment progress and aftercare plans; Patient agreeable.    DATA: Therapist met with Patient individually this date. Patient agreeable to discuss current treatment progress and discharge concerns.     This therapist called and spoke with Patient's mother to provide an update. She continues to be supportive. She states that she spoke with Patient on the phone earlier and that he stated that he did not feel ready to leave the hospital yet.     CLINICAL MANUVERING/INTERVENTIONS:  Assisted Patient in processing session content; acknowledged and normalized Patient’s thoughts, feelings, and concerns by utilizing a person-centered approach in efforts to build appropriate rapport and a positive therapeutic relationship with open and honest communication. Allowed Patient to ventilate regarding current stressors and triggers for negative emotions and thoughts in a safe nonjudgmental environment with unconditional positive regard, active listening skills, and empathy.     ASSESSMENT: Patient was seen 1-1 for a follow up today. Patient continues to be treated for major depression. Patient states that he is feeling a little better today. He states that he is getting a little more comfortable here. Patient has been observed out of his room more. However he is still experiencing SI. He states that he doesn't think that he could ever hurt himself, but that he doesn't want to be alive anymore. This therapist asked what a life worth living would look like for him. He states that he honestly is unsure about this and that he has been thinking about this a lot lately. We spoke about how past relationships have been a stressor, but Patient is unable to verbalize why they are having such an impact on him  currently. Patient states that he feels hopeless and powerless often. We discussed cognitive distortions and he feels that he does experience this often. This therapist provided him with reading material on this topic, and exercises to help him challenge these negative thoughts.       PLAN:   Patient will continue stabilization. Patient will continue to receive services offered by Treatment Team.     Patient will follow-up with [facility].

## 2023-05-30 NOTE — PLAN OF CARE
Goal Outcome Evaluation:  Plan of Care Reviewed With: patient  Patient Agreement with Plan of Care: agrees               Pt states anxiety 6, depression 10. He is calm and cooperative with staff, med compliant.

## 2023-05-31 VITALS
HEART RATE: 70 BPM | RESPIRATION RATE: 16 BRPM | WEIGHT: 136 LBS | TEMPERATURE: 97.2 F | DIASTOLIC BLOOD PRESSURE: 70 MMHG | HEIGHT: 67 IN | BODY MASS INDEX: 21.35 KG/M2 | OXYGEN SATURATION: 97 % | SYSTOLIC BLOOD PRESSURE: 126 MMHG

## 2023-05-31 PROBLEM — F32.9 MDD (MAJOR DEPRESSIVE DISORDER): Status: RESOLVED | Noted: 2023-01-01 | Resolved: 2023-01-01

## 2023-05-31 PROBLEM — F10.20 ALCOHOL USE DISORDER, SEVERE, DEPENDENCE: Status: ACTIVE | Noted: 2023-01-01

## 2023-05-31 PROBLEM — F33.2 SEVERE EPISODE OF RECURRENT MAJOR DEPRESSIVE DISORDER, WITHOUT PSYCHOTIC FEATURES: Status: ACTIVE | Noted: 2017-01-06

## 2023-05-31 PROCEDURE — 99239 HOSP IP/OBS DSCHRG MGMT >30: CPT | Performed by: PSYCHIATRY & NEUROLOGY

## 2023-05-31 RX ORDER — VENLAFAXINE HYDROCHLORIDE 150 MG/1
150 CAPSULE, EXTENDED RELEASE ORAL
Qty: 30 CAPSULE | Refills: 0 | Status: SHIPPED | OUTPATIENT
Start: 2023-06-01

## 2023-05-31 RX ORDER — TRAZODONE HYDROCHLORIDE 50 MG/1
50 TABLET ORAL NIGHTLY PRN
Qty: 30 TABLET | Refills: 0 | Status: SHIPPED | OUTPATIENT
Start: 2023-05-31

## 2023-05-31 RX ADMIN — Medication 100 MG: at 09:17

## 2023-05-31 RX ADMIN — Medication 1 TABLET: at 09:17

## 2023-05-31 RX ADMIN — VENLAFAXINE HYDROCHLORIDE 150 MG: 150 CAPSULE, EXTENDED RELEASE ORAL at 09:17

## 2023-05-31 RX ADMIN — Medication 2 TABLET: at 09:17

## 2023-05-31 NOTE — PLAN OF CARE
Goal Outcome Evaluation:  Plan of Care Reviewed With: patient  Patient Agreement with Plan of Care: agrees     Progress: improving  Outcome Evaluation: Denied SI/HI/AVH. Patient made no eye contact during assessment. Reported difficulty falling asleep and staying asleep. Anx 5, depression 10. Denied cravings. No withdrawal symptoms besides mild anxiety. Cooperative during assessment.

## 2023-05-31 NOTE — PLAN OF CARE
Goal Outcome Evaluation:  Plan of Care Reviewed With: patient  Patient Agreement with Plan of Care: agrees     Progress: improving  Outcome Evaluation: Patient calm and cooperative. Rates anxiety a 3 and depression a 4. Denies SI/HI or AVH. Patient is being discharged home today.

## 2023-05-31 NOTE — PROGRESS NOTES
Discharge Planning Assessment  [facility]     Patient Name: Antony Salgado  MRN: [id number]  Today's Date: 5/31/2023    Admit Date: 5/25/2023    Patient is being discharged home today. He denies SI, HI, and AVH Patient states that he is feeling better, and looking forward to returning home. Patient states that he is going to take some time to rest, then return to work in a couple of weeks. He has already discussed this with his boss. Patient has also made plans to get more assistance with childcare in order to reduce stress. 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 Aftercare has been arranged with [facility], and Patient states that he will be compliant. Patient was educated about the crisis hotline, and when to call 911 or present to the nearest emergency room. Safety planning has been completed with Patient's mother. Patient states that he will have a ride home today.         BILLY Chau

## 2023-06-01 NOTE — DISCHARGE SUMMARY
"      PSYCHIATRIC DISCHARGE SUMMARY     Patient Identification:  Name:  Antony Salgado  Age:  28 y.o.  Sex:  male  :  1995  MRN:  [id number]  Visit Number:  [id number]    Date of Admission:2023   Date of Discharge: 2023     Discharge Diagnosis:  Active Problems:    Severe episode of recurrent major depressive disorder, without psychotic features    Alcohol use disorder, severe, dependence      Admission Diagnosis:  MDD (major depressive disorder) [F32.9]     Hospital Course  Patient is a 28 y.o. male presented with SI.  Admitted for crisis stabilization.  No acutely concerning labs on admission.  Patient reports recent loss of girlfriend and father led to depression, increased alcohol use and SI.  Patient started on Effexor XR and eventually increased to 150 mg daily.  He was a good participant in daily sessions in the therapeutic milieu.  Patient reported improvement of symptoms, denied SI and exhibited no behavior concerning for harm to himself or others throughout this hospitalization.  Treatment and safe discharge planning completed.  Outpatient care ascertained.    On the day of discharge, patient denied SI, HI or AVH. Patient was stable and appropriate by the conclusion of this admission, denying significant symptoms of mood, psychotic or thought disorder. Patient showed improvement of presenting symptoms and was deemed appropriate for discharge today.    Mental Status Exam upon discharge:   Mood \"better\"   Affect-congruent, appropriate, stable  Thought Content-goal directed, no delusional material present  Thought process-linear, organized.  Suicidality: No SI  Homicidality: No HI  Perception: No AH/    Procedures Performed         Consults:   Consults     No orders found from 2023 to 2023.          Pertinent Test Results:   Lab Results (last 7 days)     ** No results found for the last 168 hours. **          Condition on Discharge:  improved    Vital Signs       Discharge " Disposition:  Home or Self Care    Discharge Medications:     Discharge Medications      New Medications      Instructions Start Date   traZODone 50 MG tablet  Commonly known as: DESYREL   50 mg, Oral, Nightly PRN      venlafaxine  MG 24 hr capsule  Commonly known as: EFFEXOR-XR   150 mg, Oral, Daily With Breakfast             Discharge Diet: Normal  Diet Instructions    Regular           Activity at Discharge: Normal  Activity Instructions    As tolerated           Follow-up Appointments  Future Appointments   Date Time Provider Department Center   6/12/2023  9:00 AM [facility]         Test Results Pending at Discharge  None     Time: I spent greater than 30 minutes on this discharge activity which included: face-to-face encounter with the patient, reviewing the data in the system, coordination of the care with the nursing staff as well as consultants, documentation, and entering orders.      Clinician:   Gera Nguyen MD  06/01/23  11:48 EDT

## 2023-06-12 ENCOUNTER — OFFICE VISIT (OUTPATIENT)
Dept: PSYCHIATRY | Facility: CLINIC | Age: 28
End: 2023-06-12
Payer: MEDICAID

## 2023-06-12 VITALS — BODY MASS INDEX: 21.35 KG/M2 | HEIGHT: 67 IN | WEIGHT: 136 LBS

## 2023-06-12 DIAGNOSIS — Z91.52 HISTORY OF NON-SUICIDAL SELF-HARM: ICD-10-CM

## 2023-06-12 DIAGNOSIS — F17.200 NICOTINE USE DISORDER: ICD-10-CM

## 2023-06-12 DIAGNOSIS — R45.89 SUICIDAL RISK: ICD-10-CM

## 2023-06-12 DIAGNOSIS — Z73.1 ACCENTUATION OF PERSONALITY TRAITS: ICD-10-CM

## 2023-06-12 DIAGNOSIS — F41.1 GENERALIZED ANXIETY DISORDER: ICD-10-CM

## 2023-06-12 DIAGNOSIS — F10.20 ALCOHOL USE DISORDER, MODERATE, DEPENDENCE: ICD-10-CM

## 2023-06-12 DIAGNOSIS — F34.1 DYSTHYMIA (OR DEPRESSIVE NEUROSIS): Primary | ICD-10-CM

## 2023-06-12 PROCEDURE — 1160F RVW MEDS BY RX/DR IN RCRD: CPT | Performed by: COUNSELOR

## 2023-06-12 PROCEDURE — 90840 PSYTX CRISIS EA ADDL 30 MIN: CPT | Performed by: COUNSELOR

## 2023-06-12 PROCEDURE — 90839 PSYTX CRISIS INITIAL 60 MIN: CPT | Performed by: COUNSELOR

## 2023-06-12 PROCEDURE — 1159F MED LIST DOCD IN RCRD: CPT | Performed by: COUNSELOR
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
## 2023-06-12 NOTE — PATIENT INSTRUCTIONS
Steps to Quit Smoking  Smoking tobacco is the leading cause of preventable death. It can affect almost every organ in the body. Smoking puts you and those around you at risk for developing many serious chronic diseases. Quitting smoking can be difficult, but it is one of the best things that you can do for your health. It is never too late to quit.  How do I get ready to quit?  When you decide to quit smoking, create a plan to help you succeed. Before you quit:  • Pick a date to quit. Set a date within the next 2 weeks to give you time to prepare.  • Write down the reasons why you are quitting. Keep this list in places where you will see it often.  • Tell your family, friends, and co-workers that you are quitting. Support from your loved ones can make quitting easier.  • Talk with your health care provider about your options for quitting smoking.  • Find out what treatment options are covered by your health insurance.  • Identify people, places, things, and activities that make you want to smoke (triggers). Avoid them.  What first steps can I take to quit smoking?  • Throw away all cigarettes at home, at work, and in your car.  • Throw away smoking accessories, such as ashtrays and lighters.  • Clean your car. Make sure to empty the ashtray.  • Clean your home, including curtains and carpets.  What strategies can I use to quit smoking?  Talk with your health care provider about combining strategies, such as taking medicines while you are also receiving in-person counseling. Using these two strategies together makes you more likely to succeed in quitting than if you used either strategy on its own.  • If you are pregnant or breastfeeding, talk with your health care provider about finding counseling or other support strategies to quit smoking. Do not take medicine to help you quit smoking unless your health care provider tells you to do so.  To quit smoking:  Quit right away  • Quit smoking completely, instead of  gradually reducing how much you smoke over a period of time. Research shows that stopping smoking right away is more successful than gradually quitting.  • Attend in-person counseling to help you build problem-solving skills. You are more likely to succeed in quitting if you attend counseling sessions regularly. Even short sessions of 10 minutes can be effective.  Take medicine  You may take medicines to help you quit smoking. Some medicines require a prescription and some you can purchase over-the-counter. Medicines may have nicotine in them to replace the nicotine in cigarettes. Medicines may:  • Help to stop cravings.  • Help to relieve withdrawal symptoms.  Your health care provider may recommend:  • Nicotine patches, gum, or lozenges.  • Nicotine inhalers or sprays.  • Non-nicotine medicine that is taken by mouth.  Find resources  Find resources and support systems that can help you to quit smoking and remain smoke-free after you quit. These resources are most helpful when you use them often. They include:  • Online chats with a counselor.  • Telephone quitlines.  • Printed self-help materials.  • Support groups or group counseling.  • Text messaging programs.  • Mobile phone apps or applications. Use apps that can help you stick to your quit plan by providing reminders, tips, and encouragement. There are many free apps for mobile devices as well as websites. Examples include Quit Guide from the CDC and smokefree.gov  What things can I do to make it easier to quit?    • Reach out to your family and friends for support and encouragement. Call telephone quitlines (1-950-QUIT-NOW), reach out to support groups, or work with a counselor for support.  • Ask people who smoke to avoid smoking around you.  • Avoid places that trigger you to smoke, such as bars, parties, or smoke-break areas at work.  • Spend time with people who do not smoke.  • Lessen the stress in your life. Stress can be a smoking trigger for some  people. To lessen stress, try:  ? Exercising regularly.  ? Doing deep-breathing exercises.  ? Doing yoga.  ? Meditating.  ? Performing a body scan. This involves closing your eyes, scanning your body from head to toe, and noticing which parts of your body are particularly tense. Try to relax the muscles in those areas.  How will I feel when I quit smoking?  Day 1 to 3 weeks  Within the first 24 hours of quitting smoking, you may start to feel withdrawal symptoms. These symptoms are usually most noticeable 2-3 days after quitting, but they usually do not last for more than 2-3 weeks. You may experience these symptoms:  • Mood swings.  • Restlessness, anxiety, or irritability.  • Trouble concentrating.  • Dizziness.  • Strong cravings for sugary foods and nicotine.  • Mild weight gain.  • Constipation.  • Nausea.  • Coughing or a sore throat.  • Changes in how the medicines that you take for unrelated issues work in your body.  • Depression.  • Trouble sleeping (insomnia).  Week 3 and afterward  After the first 2-3 weeks of quitting, you may start to notice more positive results, such as:  • Improved sense of smell and taste.  • Decreased coughing and sore throat.  • Slower heart rate.  • Lower blood pressure.  • Clearer skin.  • The ability to breathe more easily.  • Fewer sick days.  Quitting smoking can be very challenging. Do not get discouraged if you are not successful the first time. Some people need to make many attempts to quit before they achieve long-term success. Do your best to stick to your quit plan, and talk with your health care provider if you have any questions or concerns.  Summary  • Smoking tobacco is the leading cause of preventable death. Quitting smoking is one of the best things that you can do for your health.  • When you decide to quit smoking, create a plan to help you succeed.  • Quit smoking right away, not slowly over a period of time.  • When you start quitting, seek help from your  health care provider, family, or friends.  This information is not intended to replace advice given to you by your health care provider. Make sure you discuss any questions you have with your health care provider.  Document Revised: 08/26/2022 Document Reviewed: 03/07/2020  Elsevier Patient Education © 2022 [employer]    [facility] Patient Safety Plan  Please fill out  at home, sign and date, print copy to put on refrigerator or mirror, or other visible space, etc. Keep copy in billfold or purse. Take photo to keep on SmartPhone.   Bring or send copy to primary care provider as well.    6/12/2023   Patient Name: Antony Salgado  YOB: 1995    Suicide Prevention Hotline:   Call 209 or 1-275.914.6546 or text Talk at 075-985    Recognize the warning signs (thoughts, images, mood, thinking processes, behaviors, situations) that a crisis may be developing.  1.          2.   3.  Using internal coping strategies (what can you do on your own to help you not act on your thoughts/urges? i.e. relaxation techniques, physical activity)  1.  2.  3.  Using social contacts to provide support and distraction (who is supportive of you that you can talk to when you are stressed?)  1.  2.  3.  What is one thing that is most important to you and is worth living for?    Making you environment safe (what means do you have access to and are likely to use to attempt suicide? How can you limit access to these means?    Contacting Professional Agencies:  Therapist     Phone #  Psychiatrist/ARNP    Phone #  Primary Care Doctor    Phone #        What might be a barrier to using this safety plan?        Signature_____________________________________Date_____________      Suicidal Feelings: How to Help Yourself  Suicide is when you end your own life. Suicidal ideation includes expressing thoughts about, or a preoccupation with, ending your own life. There are many things you can do to help yourself feel better when struggling with these  feelings. Many services and people are available to support you and others who struggle with similar feelings.  If you ever feel like you may hurt yourself or others, or have thoughts about taking your own life, get help right away. To get help:  • Go to your nearest emergency department.  • Call your local emergency services (911 in the [place]  • Call the [facility] services helpline (211 in the [place]  • Call or text a suicide hotline to speak with a trained counselor. The following suicide hotlines are available in the [place]:  ? 7-354-585-TALK (1-469.810.7719 or 657 in the [place]  ? 2-545-FPSLWQW (1-197.258.4072).  ? Text 577799. This is the Crisis Text Line in the [place]  ? 1-779.187.7081. This is a hotline for [place] speakers.  ? 1-679.988.6830. This is a hotline for TTY users.  ? 9-973-9-U-ELOY (1-209.346.2041). This is a hotline for lesbian, shannon, bisexual, transgender, or questioning youth.  ? For a list of hotlines in [place], visit suicide.org/hotlines/international/hhhdip-mdbpovf-ugnnhhtf.html  • Contact a crisis center or a local suicide prevention center. To find a crisis center or suicide prevention center:  ? Call your local hospital, clinic, community service organization, mental health center, social service provider, or health department. Ask for help with connecting to a crisis center.  ? For a list of crisis centers in the [place], visit: suicidepreventionlifeline.org  ? For a list of crisis centers in [place], visit: suicideprevention.ca  How to help yourself feel better    • Promise yourself that you will not do anything bad or extreme when you have suicidal feelings. Remember the times you have felt hopeful.  ? Many people have gotten through suicidal thoughts and feelings, and you can too.  ? If you have had these feelings before, remind yourself that you can get through them again.  • Let family, friends, teachers, or counselors know how you are feeling. Do not  separate yourself from those who care about you and want to help you.  ? Talk with someone every day, even if you do not feel like talking to anyone or being with other people.  ? Face-to-face conversation is best to help them understand your feelings.  • Contact a mental health care provider and work with this person regularly.  • Make a safety plan that you can follow during a crisis.  ? Include phone numbers of suicide prevention hotlines, mental health professionals, and trusted friends and family members you can call during an emergency.  ? Save these numbers on your phone.  • If you are thinking of taking a lot of medicine, give your medicine to someone who can give it to you as prescribed.  ? If you are on antidepressants and are concerned you will overdose, tell your health care provider so that he or she can give you safer medicines.  • Try to stick to your routines and follow a schedule every day. Make self-care a priority.  • Make a list of realistic goals, and cross them off when you achieve them. Accomplishments can give you a sense of worth.  • Wait until you are feeling better before doing things that you find difficult or unpleasant.  • Do things that you have always enjoyed to take your mind off your feelings.  ? Try reading a book, or listening to or playing music.  ? Spending time outside, in nature, may help you feel better.  Follow these instructions at home:    • Visit your primary health care provider every year for a physical and a mental health checkup.  • Take over-the-counter and prescription medicines only as told by your health care provider.  ? Ask your health care provider about the possible side effects of any medicines you are taking.  ? Ask your health care provider about whether suicidal ideation is a possible side effect of any of your medicines.  • Learn about suicidal ideation and what increases the risk for the development of suicidal thoughts.  • Eat a well-balanced diet, and  eat regular meals.  • Get plenty of rest.  • Exercise if you are able. Just 30 minutes of exercise each day can help you feel better.  • Keep your living space well lit.  • Do not use alcohol or drugs. Remove these substances from your home.  General recommendations  • Remove weapons, poisons, knives, and other deadly items from your home.  • Work with a mental health care provider as needed.  • When you are feeling well, write yourself a letter with tips and support that you can read when you are not feeling well.  • Remember that life's difficulties can be sorted out with help. Conditions can be treated, and you can learn behaviors and ways of thinking that will help you.  ? Work with your health care provider or counselor to learn ways of coping with your thoughts and feelings.  Where to find more information  • National Suicide Prevention Lifeline: www.suicidepreventionlifeline.org  • Hopeline: www.hopeline.[employer] Web Services  • American Foundation for Suicide Prevention: www.afsp.org  • The [place] Project (for lesbian, shannon, bisexual, transgender, or questioning youth): www.thetrevorproject.org  • National [place] of Mental Health: www.nimh.nih.gov/health/topics/suicide-prevention  • Suicide Prevention Resources: afsp.org/suicide-prevention-resources  Contact a health care provider if:  • You feel as though you are a burden to others.  • You feel agitated, angry, vengeful, or have extreme mood swings.  • You have withdrawn from family and friends.  • You are frequently using drugs or alcohol.  Get help right away if:  • You are talking about suicide or wishing to die.  • You start making plans for how to commit suicide.  • You feel that you have no reason to live.  • You start making plans for putting your affairs in order, saying goodbye, or giving your possessions away.  • You feel guilt, shame, or unbearable pain, and it seems like there is no way out.  • You are engaging in risky behaviors that could lead to death.  If  you have any of these thoughts or symptoms, get help right away:  • Go to your nearest emergency department or crisis center.  • Call emergency services (911 in the [place]  • Call or text a suicide crisis helpline.  Summary  • Suicide is when you take your own life. Suicidal feelings are thoughts about ending your own life.  • Promise yourself that you will not do anything bad or extreme when you have suicidal feelings.  • Let family, friends, teachers, or counselors know how you are feeling.  • Get help right away if you start making plans for how to commit suicide.  This information is not intended to replace advice given to you by your health care provider. Make sure you discuss any questions you have with your health care provider.  Document Revised: 07/14/2022 Document Reviewed: 04/28/2022  [employer] Patient Education © 2022 [employer]      Persistent Depressive Disorder, Adult  Persistent depressive disorder (PDD) is a mental health condition that causes symptoms of low-level depression for 2 years or longer. This disorder may also be called long-term (chronic) depression or dysthymia. PDD may include episodes of major depressive disorder (MDD), which is more severe depression that lasts for about 2 weeks or longer.  PDD can affect the way you think, feel, and behave. This condition may also affect your relationships. You may be more likely to get sick if you have PDD.  What are the causes?  The exact cause of this condition is not known. PDD is most likely caused by a combination of risk factors.  What increases the risk?  The following factors may make someone more likely to develop PDD:  • A family history of depression.  • Being female.  • Abnormally low levels of certain brain chemicals.  • Traumatic or painful events in childhood, especially abuse or the loss of a parent.  • Chronic stress caused by upsetting life experiences, troubled family relationships, or losses.  • Chronic physical illness or  other mental health disorders.  • Cultural stress, such as stress from poor living conditions or discrimination.  What are the signs or symptoms?  Symptoms of this condition may occur for most of the day, and may include:  • Physical symptoms, such as:  ? Tiredness or low energy.  ? Eating or sleeping too much or too little.  ? Being restless or agitated.  ? Unexplained physical complaints.  • Mental and emotional symptoms, such as:  ? Feeling hopeless, worthless, or guilty.  ? Anxiety.  ? Trouble focusing or making decisions.  ? Low self-esteem.  ? Negative outlook.  ? Feeling irritable.  ? Being unable to have fun or feel pleasure.  • Behavioral symptoms, such as:  ? Withdrawing from others.  ? Aggressive behavior or anger.  How is this diagnosed?  This condition may be diagnosed based on:  • Your symptoms.  • Your medical history, including your mental health history. This may involve tests to evaluate your level of depression. You may be asked questions about your lifestyle, including any drug and alcohol use, and how long you have had symptoms of PDD.  • A physical exam.  • Blood tests to rule out other conditions.  PDD may be diagnosed if you have had the following symptoms:  • A depressed mood or loss of interest in things for 2 years or longer.  • Other symptoms of depression.  How is this treated?  This condition is usually treated by mental health professionals, such as psychologists, psychiatrists, psychiatric nurse practitioners, and clinical social workers. You may need more than one type of treatment. Treatment may include:  • Psychotherapy, also called talk therapy or counseling. Types of psychotherapy include:  ? Cognitive behavioral therapy (CBT). This teaches you to recognize unhealthy feelings, thoughts, and behaviors, and to replace them with positive thoughts and actions.  ? Interpersonal therapy (IPT). This helps you improve the way you relate to and communicate with others.  ? Family therapy.  This treatment involves members of your family.  • Medicines to treat anxiety and depression or to help balance chemicals in your brain that affect emotions and behaviors.  • Lifestyle changes, such as:  ? Limiting alcohol and drug use.  ? Getting regular exercise.  ? Developing a consistent sleep routine.  ? Making healthy eating choices.  A combination of psychotherapy and medicines is thought to be the most effective form of treatment.  Follow these instructions at home:  Activity    • Exercise regularly and spend time outdoors.  • Find activities that you enjoy doing, and make time to do them.  • Find healthy ways to manage stress, such as:  ? Meditation or deep breathing.  ? Spending time in nature.  ? Journaling.  • Return to your normal activities as told by your health care provider.  Alcohol and drug use  • If you drink alcohol:  ? Limit how much you have to:  - 0-1 drink a day for women who are not pregnant.  - 0-2 drinks a day for men.  ? Know how much alcohol is in a drink. In the [place] one drink equals one 12 oz bottle of beer (355 mL), one 5 oz glass of wine (148 mL), or one 1½ oz glass of hard liquor (44 mL).  ? Discuss your alcohol or drug use with your health care provider. Alcohol and drugs can affect any antidepressant medicines you are taking.  General instructions    • Take over-the-counter medicines, prescription medicines, and herbal preparations only as told by your health care provider.  • Eat a healthy diet and get plenty of sleep.  • Consider joining a support group. Your health care provider may be able to recommend one.  • Keep all follow-up visits. This is important.  Where to find more information  • National [place] on Mental Illness: www.andreina.org  • National [place] of Mental Health: www.nimh.nih.gov  • American Psychiatric Association: www.psychiatry.org/patients-families  Contact a health care provider if:  • Your symptoms get worse.  • You develop new symptoms.  Get help right  away if:  • You harm yourself.  • You have serious thoughts about hurting yourself or others.  • You see, hear, taste, smell, or feel things that are not real (hallucinate).  If you ever feel like you may hurt yourself or others, or have thoughts about taking your own life, get help right away. Go to your nearest emergency department or:  • Call your local emergency services (460 in the [place]  • Call a suicide crisis helpline, such as the National Suicide Prevention Lifeline at 1-808.844.3472 or 975 in the [place] This is open 24 hours a day in the [place]  • Text the Crisis Text Line at 675552 (in the [place]  Summary  • Persistent depressive disorder (PDD) is a mental health condition that causes symptoms of low-level depression for 2 years or longer.  • This condition is usually treated by mental health professionals. You may need more than one type of treatment. Treatment may involve psychotherapy, medicines, and lifestyle changes.  • Get help right away if you have serious thoughts about hurting yourself or others.  This information is not intended to replace advice given to you by your health care provider. Make sure you discuss any questions you have with your health care provider.  Document Revised: 07/13/2022 Document Reviewed: 04/07/2022  [employer] Patient Education © 2022 [employer]      Major Depressive Disorder, Adult  Major depressive disorder (MDD) is a mental health condition. It may also be called clinical depression or unipolar depression. MDD causes symptoms of sadness, hopelessness, and loss of interest in things. These symptoms last most of the day, almost every day, for 2 weeks. MDD can also cause physical symptoms. It can interfere with relationships and with everyday activities, such as work, school, and activities that are usually pleasant.  MDD may be mild, moderate, or severe. It may be single-episode MDD, which happens once, or recurrent MDD, which may occur multiple times.  What are the  causes?  The exact cause of this condition is not known. MDD is most likely caused by a combination of things, which may include:  • Your personality traits.  • [place] or conditioned behaviors or thoughts or feelings that reinforce negativity.  • Any alcohol or substance misuse.  • Long-term (chronic) physical or mental health illness.  • Going through a traumatic experience or major life changes.  What increases the risk?  The following factors may make someone more likely to develop MDD:  • A family history of depression.  • Being a woman.  • Troubled family relationships.  • Abnormally low levels of certain brain chemicals.  • Traumatic or painful events in childhood, especially abuse or loss of a parent.  • A lot of stress from life experiences, such as poor living conditions or discrimination.  • Chronic physical illness or other mental health disorders.  What are the signs or symptoms?  The main symptoms of MDD usually include:  • Constant depressed or irritable mood.  • A loss of interest in things and activities.  Other symptoms include:  • Sleeping or eating too much or too little.  • Unexplained weight gain or weight loss.  • Tiredness or low energy.  • Being agitated, restless, or weak.  • Feeling hopeless, worthless, or guilty.  • Trouble thinking clearly or making decisions.  • Thoughts of suicide or thoughts of harming others.  • Isolating oneself or avoiding other people or activities.  • Trouble completing tasks, work, or any normal obligations.  Severe symptoms of this condition may include:  • Psychotic depression.This may include false beliefs, or delusions. It may also include seeing, hearing, tasting, smelling, or feeling things that are not real (hallucinations).  • Chronic depression or persistent depressive disorder. This is low-level depression that lasts for at least 2 years.  • Melancholic depression, or feeling extremely sad and hopeless.  • Catatonic depression, which includes trouble  speaking and trouble moving.  How is this diagnosed?  This condition may be diagnosed based on:  • Your symptoms.  • Your medical and mental health history. You may be asked questions about your lifestyle, including any drug and alcohol use.  • A physical exam.  • Blood tests to rule out other conditions.  MDD is confirmed if you have the following symptoms most of the day, nearly every day, in a 2-week period:  • Either a depressed mood or loss of interest.  • At least four other MDD symptoms.  How is this treated?  This condition is usually treated by mental health professionals, such as psychologists, psychiatrists, and clinical social workers. You may need more than one type of treatment. Treatment may include:  • Psychotherapy, also called talk therapy or counseling. Types of psychotherapy include:  ? Cognitive behavioral therapy (CBT). This teaches you to recognize unhealthy feelings, thoughts, and behaviors, and replace them with positive thoughts and actions.  ? Interpersonal therapy (IPT). This helps you to improve the way you communicate with others or relate to them.  ? Family therapy. This treatment includes members of your family.  • Medicines to treat anxiety and depression. These medicines help to balance the brain chemicals that affect your emotions.  • Lifestyle changes. You may be asked to:  ? Limit alcohol use and avoid drug use.  ? Get regular exercise.  ? Get plenty of sleep.  ? Make healthy eating choices.  ? Spend more time outdoors.  • Brain stimulation. This may be done if symptoms are very severe and other treatments have not worked. Examples of this treatment are electroconvulsive therapy and transcranial magnetic stimulation.  Follow these instructions at home:  Activity  • Exercise regularly and spend time outdoors.  • Find activities that you enjoy doing, and make time to do them.  • Find healthy ways to manage stress, such as:  ? Meditation or deep breathing.  ? Spending time in  nature.  ? Journaling.  • Return to your normal activities as told by your health care provider. Ask your health care provider what activities are safe for you.  Alcohol and drug use  • If you drink alcohol:  ? Limit how much you use to:  - 0-1 drink a day for women who are not pregnant.  - 0-2 drinks a day for men.  ? Be aware of how much alcohol is in your drink. In the [place] one drink equals one 12 oz bottle of beer (355 mL), one 5 oz glass of wine (148 mL), or one 1½ oz glass of hard liquor (44 mL).  ? Discuss your alcohol use with your health care provider. Alcohol can affect any antidepressant medicines you are taking.  • Discuss any drug use with your health care provider.  General instructions    • Take over-the-counter and prescription medicines only as told by your health care provider.  • Eat a healthy diet and get plenty of sleep.  • Consider joining a support group. Your health care provider may be able to recommend one.  • Keep all follow-up visits as told by your health care provider. This is important.  Where to find more information  • National [place] on Mental Illness: www.andreina.org  • [place] National [place] of Mental Health: www.nimh.nih.gov  Contact a health care provider if:  • Your symptoms get worse.  • You develop new symptoms.  Get help right away if:  • You self-harm.  • You have serious thoughts about hurting yourself or others.  • You hallucinate.  If you ever feel like you may hurt yourself or others, or have thoughts about taking your own life, get help right away. Go to your nearest emergency department or:  • Call your local emergency services (686 in the [place]  • Call a suicide crisis helpline, such as the National Suicide Prevention Lifeline at 1-112.989.7506 or 482 in the [place] This is open 24 hours a day in the [place]  • Text the Crisis Text Line at 780986 (in the [place]  Summary  • Major depressive disorder (MDD) is a mental health condition. MDD causes symptoms of sadness,  hopelessness, and loss of interest in things. These symptoms last most of the day, almost every day, for 2 weeks.  • The symptoms of MDD can interfere with relationships and with everyday activities.  • Treatments and support are available for people who develop MDD. You may need more than one type of treatment.  • Get help right away if you have serious thoughts about hurting yourself or others.  This information is not intended to replace advice given to you by your health care provider. Make sure you discuss any questions you have with your health care provider.  Document Revised: 07/13/2022 Document Reviewed: 11/28/2020  Elsevier Patient Education © 2022 [employer] Inc.

## 2023-06-24 PROBLEM — R45.851 SUICIDAL IDEATION: Status: ACTIVE | Noted: 2023-06-24

## 2023-06-30 PROBLEM — R45.851 SUICIDAL IDEATION: Status: RESOLVED | Noted: 2023-06-24 | Resolved: 2023-06-30

## 2023-06-30 PROBLEM — F60.81: Status: ACTIVE | Noted: 2023-06-30

## 2023-07-18 ENCOUNTER — APPOINTMENT (OUTPATIENT)
Dept: PSYCHIATRY | Facility: HOSPITAL | Age: 28
End: 2023-07-18
Payer: MEDICAID

## 2023-07-20 ENCOUNTER — OFFICE VISIT (OUTPATIENT)
Dept: PSYCHIATRY | Facility: HOSPITAL | Age: 28
End: 2023-07-20
Payer: MEDICAID

## 2023-07-20 DIAGNOSIS — F12.90 CANNABIS USE DISORDER: ICD-10-CM

## 2023-07-20 DIAGNOSIS — G47.09 OTHER INSOMNIA: ICD-10-CM

## 2023-07-20 DIAGNOSIS — F41.1 GENERALIZED ANXIETY DISORDER: ICD-10-CM

## 2023-07-20 DIAGNOSIS — F10.20 ALCOHOL USE DISORDER, MODERATE, DEPENDENCE: ICD-10-CM

## 2023-07-20 DIAGNOSIS — F33.2 SEVERE EPISODE OF RECURRENT MAJOR DEPRESSIVE DISORDER, WITHOUT PSYCHOTIC FEATURES: Primary | ICD-10-CM

## 2023-07-20 DIAGNOSIS — F17.200 NICOTINE USE DISORDER: ICD-10-CM

## 2023-07-20 PROCEDURE — 1160F RVW MEDS BY RX/DR IN RCRD: CPT | Performed by: PSYCHIATRY & NEUROLOGY

## 2023-07-20 PROCEDURE — 1159F MED LIST DOCD IN RCRD: CPT | Performed by: PSYCHIATRY & NEUROLOGY

## 2023-07-20 NOTE — PROGRESS NOTES
Subjective   Antony Salgado is a 28 y.o. male who presents today for follow up    Chief Complaint:  Depression, Anxiety    History of Present Illness: Patient presenting today for follow-up in the partial hospitalization program.  Since last visit, patient again had a suicide attempt via the same means as before by running exhaust into the building that he was in.  He states that he is not sure why he did it as he was not feeling more depressed or suicidal than previously noted but feels that since he had already made a plan and had not followed through, he may have fixated on the idea.  He states he was put back on trazodone during hospitalization as the only medication change but is now back to Remeron and reports that he is sleeping well with this medication.  He states when asked about suicidal ideation now that he feels that he may want to stay because he has tried and failed and also realizes that he has things to look forward to despite not feeling like he has things to look forward to.  He denies any medication side effects.  He somewhat minimizes his suicide attempts and at this point in time, I feel that patient is at high risk and needs to be monitored closely.    The following portions of the patient's history were reviewed and updated as appropriate: allergies, current medications, past family history, past medical history, past social history, past surgical history and problem list.      Past Medical History:  Past Medical History:   Diagnosis Date    Alcohol abuse     Anxiety     Depression     Heart valve regurgitation     Self-injurious behavior     from age [age]    Suicidal thoughts     Suicide attempt     01/2017: states his had tried to kill self multiple times. States he held knife few days ago but didnt do it; 07/10-07/15/23 [facility] for Suicide attempt w/intent, plan means (tried carbon monoxide)       Social History:  Social History     Socioeconomic History     Marital status: Legally      Spouse name: Sana    Number of children: 2    Years of education: 12    Highest education level: High school graduate   Tobacco Use    Smoking status: Every Day     Packs/day: 0.50     Years: 12.00     Pack years: 6.00     Types: Cigarettes    Smokeless tobacco: Never    Tobacco comments:     Started using tobacco products at age 16   Vaping Use    Vaping Use: Never used   Substance and Sexual Activity    Alcohol use: Not Currently     Comment: a 6 pack a day.    Drug use: Yes     Types: Marijuana, Other     Comment: ETOH; last use 07/15/23; made him sick    Sexual activity: Defer     Partners: Female       Family History:  Family History   Problem Relation Age of Onset    Drug abuse Sister     Suicide Attempts Sister     Suicide Attempts Brother        Past Surgical History:  No past surgical history on file.    Problem List:  Patient Active Problem List   Diagnosis    Severe episode of recurrent major depressive disorder, without psychotic features    Alcohol use disorder, severe, dependence    Accentuation of personality traits    Narcissism in adult       Allergy:   No Known Allergies     Current Medications:   Current Outpatient Medications   Medication Sig Dispense Refill    ARIPiprazole (ABILIFY) 5 MG tablet Take 1 tablet by mouth Daily. 30 tablet 0    buPROPion XL (WELLBUTRIN XL) 300 MG 24 hr tablet Take 1 tablet by mouth Every Morning. Indications: Major Depressive Disorder 30 tablet 0    mirtazapine (REMERON) 7.5 MG tablet Take 1 tablet by mouth Every Night. 30 tablet 0     No current facility-administered medications for this visit.       Review of Symptoms:    Review of Systems   Constitutional:  Positive for activity change, appetite change and fatigue.   HENT:  Negative for congestion and voice change.    Eyes:  Negative for photophobia and visual disturbance.   Respiratory:  Negative for shortness of breath and wheezing.    Cardiovascular:  Negative for chest  pain and palpitations.   Gastrointestinal:  Negative for nausea and vomiting.   Endocrine: Negative for cold intolerance and heat intolerance.   Genitourinary:  Negative for frequency and urgency.   Musculoskeletal:  Negative for myalgias and neck stiffness.   Skin:  Negative for rash and wound.   Allergic/Immunologic: Negative for environmental allergies and food allergies.   Neurological:  Negative for tremors and weakness.   Hematological:  Negative for adenopathy. Does not bruise/bleed easily.   Psychiatric/Behavioral:  Positive for sleep disturbance, suicidal ideas, depressed mood and stress. The patient is nervous/anxious.        Physical Exam:   There were no vitals taken for this visit.    Appearance: CM of stated age, NAD   Gait, Station, Strength: WNL    Mental Status Exam:     Mental Status exam performed 07/20/2023  and patient shows no significant changes from previous exam.     Hygiene:   good  Cooperation:  Cooperative  Eye Contact:  Good  Psychomotor Behavior:  Appropriate  Affect:  Restricted  Mood: depressed and anxious  Hopelessness: 10  Speech:  Normal  Thought Process:  Goal directed and Linear  Thought Content:  Normal and Mood congruent  Suicidal:  Suicidal Ideation  Homicidal:  None  Hallucinations:  None  Delusion:  None  Memory:  Intact  Orientation:  Person, Place, Time, and Situation  Reliability:  good  Insight:  Fair  Judgement:  Poor  Impulse Control:  Poor      Lab Results:   Office Visit on 07/03/2023   Component Date Value Ref Range Status    External Amphetamine Screen Urine 07/03/2023 Negative   Final    External Benzodiazepine Screen Uri* 07/03/2023 Negative   Final    External Cocaine Screen Urine 07/03/2023 Negative   Final    External THC Screen Urine 07/03/2023 Positive (A)   Final    External Methadone Screen Urine 07/03/2023 Negative   Final    External Methamphetamine Screen Ur* 07/03/2023 Negative   Final    External Oxycodone Screen Urine 07/03/2023 Negative   Final     External Buprenorphine Screen Urine 07/03/2023 Negative   Final    External MDMA 07/03/2023 Negative   Final    External Opiates Screen Urine 07/03/2023 Negative   Final   Admission on 06/23/2023, Discharged on 06/24/2023   Component Date Value Ref Range Status    QT Interval 06/23/2023 348  ms Final    QTC Interval 06/23/2023 457  ms Final    Glucose 06/23/2023 98  65 - 99 mg/dL Final    BUN 06/23/2023 11  6 - 20 mg/dL Final    Creatinine 06/23/2023 0.91  0.76 - 1.27 mg/dL Final    Sodium 06/23/2023 139  136 - 145 mmol/L Final    Potassium 06/23/2023 5.6 (H)  3.5 - 5.2 mmol/L Final    Specimen hemolyzed.  Results may be affected.    Chloride 06/23/2023 101  98 - 107 mmol/L Final    CO2 06/23/2023 23.6  22.0 - 29.0 mmol/L Final    Calcium 06/23/2023 9.9  8.6 - 10.5 mg/dL Final    Total Protein 06/23/2023 7.6  6.0 - 8.5 g/dL Final    Albumin 06/23/2023 4.6  3.5 - 5.2 g/dL Final    ALT (SGPT) 06/23/2023 27  1 - 41 U/L Final    Specimen hemolyzed.  Results may be affected.    AST (SGOT) 06/23/2023 40  1 - 40 U/L Final    Alkaline Phosphatase 06/23/2023 79  39 - 117 U/L Final    Total Bilirubin 06/23/2023 0.7  0.0 - 1.2 mg/dL Final    Globulin 06/23/2023 3.0  gm/dL Final    A/G Ratio 06/23/2023 1.5  g/dL Final    BUN/Creatinine Ratio 06/23/2023 12.1  7.0 - 25.0 Final    Anion Gap 06/23/2023 14.4  5.0 - 15.0 mmol/L Final    eGFR 06/23/2023 116.3  >60.0 mL/min/1.73 Final    proBNP 06/23/2023 <36.0  0.0 - 450.0 pg/mL Final    HS Troponin T 06/23/2023 <6  <15 ng/L Final    Specimen hemolyzed.  Results may be affected.    Protime 06/23/2023 12.2  12.1 - 14.7 Seconds Final    INR 06/23/2023 0.86 (L)  0.90 - 1.10 Final    Extra Tube 06/23/2023 Hold for add-ons.   Final    Auto resulted.    Extra Tube 06/23/2023 hold for add-on   Final    Auto resulted    Extra Tube 06/23/2023 Hold for add-ons.   Final    Auto resulted.    Extra Tube 06/23/2023 Hold for add-ons.   Final    Auto resulted    WBC 06/23/2023 14.66 (H)  3.40 - 10.80  10*3/mm3 Final    RBC 06/23/2023 4.74  4.14 - 5.80 10*6/mm3 Final    Hemoglobin 06/23/2023 15.2  13.0 - 17.7 g/dL Final    Hematocrit 06/23/2023 43.2  37.5 - 51.0 % Final    MCV 06/23/2023 91.1  79.0 - 97.0 fL Final    MCH 06/23/2023 32.1  26.6 - 33.0 pg Final    MCHC 06/23/2023 35.2  31.5 - 35.7 g/dL Final    RDW 06/23/2023 11.9 (L)  12.3 - 15.4 % Final    RDW-SD 06/23/2023 40.2  37.0 - 54.0 fl Final    MPV 06/23/2023 10.4  6.0 - 12.0 fL Final    Platelets 06/23/2023 257  140 - 450 10*3/mm3 Final    Neutrophil % 06/23/2023 90.3 (H)  42.7 - 76.0 % Final    Lymphocyte % 06/23/2023 5.8 (L)  19.6 - 45.3 % Final    Monocyte % 06/23/2023 2.9 (L)  5.0 - 12.0 % Final    Eosinophil % 06/23/2023 0.0 (L)  0.3 - 6.2 % Final    Basophil % 06/23/2023 0.5  0.0 - 1.5 % Final    Immature Grans % 06/23/2023 0.5  0.0 - 0.5 % Final    Neutrophils, Absolute 06/23/2023 13.23 (H)  1.70 - 7.00 10*3/mm3 Final    Lymphocytes, Absolute 06/23/2023 0.85  0.70 - 3.10 10*3/mm3 Final    Monocytes, Absolute 06/23/2023 0.43  0.10 - 0.90 10*3/mm3 Final    Eosinophils, Absolute 06/23/2023 0.00  0.00 - 0.40 10*3/mm3 Final    Basophils, Absolute 06/23/2023 0.08  0.00 - 0.20 10*3/mm3 Final    Immature Grans, Absolute 06/23/2023 0.07 (H)  0.00 - 0.05 10*3/mm3 Final    nRBC 06/23/2023 0.0  0.0 - 0.2 /100 WBC Final    Lactate 06/23/2023 2.4 (C)  0.5 - 2.0 mmol/L Final    THC, Screen, Urine 06/24/2023 Positive (A)  Negative Final    Phencyclidine (PCP), Urine 06/24/2023 Negative  Negative Final    Cocaine Screen, Urine 06/24/2023 Negative  Negative Final    Methamphetamine, Ur 06/24/2023 Negative  Negative Final    Opiate Screen 06/24/2023 Negative  Negative Final    Amphetamine Screen, Urine 06/24/2023 Negative  Negative Final    Benzodiazepine Screen, Urine 06/24/2023 Negative  Negative Final    Tricyclic Antidepressants Screen 06/24/2023 Negative  Negative Final    Methadone Screen, Urine 06/24/2023 Negative  Negative Final    Barbiturates Screen, Urine  06/24/2023 Negative  Negative Final    Oxycodone Screen, Urine 06/24/2023 Negative  Negative Final    Propoxyphene Screen 06/24/2023 Negative  Negative Final    Buprenorphine, Screen, Urine 06/24/2023 Negative  Negative Final    Acetaminophen 06/23/2023 <5.0  0.0 - 30.0 mcg/mL Final    Salicylate 06/23/2023 <0.3  <=30.0 mg/dL Final    Ethanol 06/23/2023 <10  0 - 10 mg/dL Final    Specimen hemolyzed.  Results may be affected.    Ethanol % 06/23/2023 <0.010  % Final    Acetone 06/23/2023 Negative  Negative Final    Creatine Kinase 06/23/2023 115  20 - 200 U/L Final    Specimen hemolyzed.  Results may be affected.    Site 06/23/2023 Nurse/Dr Meyers   Final    pH, Venous 06/23/2023 7.444 (H)  7.320 - 7.420 pH Units Final    pCO2, Venous 06/23/2023 41.3  41.0 - 51.0 mm Hg Final    pO2, Venous 06/23/2023 18.1 (L)  27.0 - 53.0 mm Hg Final    84 Value below reference range    HCO3, Venous 06/23/2023 28.3 (H)  22.0 - 28.0 mmol/L Final    Base Excess, Venous 06/23/2023 3.8 (H)  0.0 - 2.0 mmol/L Final    O2 Saturation, Venous 06/23/2023 56.3  45.0 - 75.0 % Final    Hemoglobin, Blood Gas 06/23/2023 14.9  14 - 18 g/dL Final    CO2 Content 06/23/2023 29.6  22 - 33 mmol/L Final    Barometric Pressure for Blood Gas 06/23/2023 725  mmHg Final    Modality 06/23/2023 NRB   Final    FIO2 06/23/2023 100  % Final    Flow Rate 06/23/2023 15.0  lpm Final    Notified Who 06/23/2023 DR TOVAR AND RN   Final    Notified By 06/23/2023 [id number]   Final    Notified Time 06/23/2023 06/23/2023 20:27   Final    Collected by 06/23/2023 [id number]   Final    Meter: [id number]     :  [id number]    Oxyhemoglobin Venous 06/23/2023 38.8 (L)  45.0 - 75.0 % Final    84 Value below reference range    Carboxyhemoglobin Venous 06/23/2023 30.8 (C)  0.0 - 5.0 % Final    86 Value above critical limit    Methemoglobin Venous 06/23/2023 0.3  0.0 - 3.0 % Final    Lactate 06/23/2023 2.0  0.5 - 2.0 mmol/L Final    Site 06/23/2023 OTHER   Final    pH, Venous  06/23/2023 7.420  7.320 - 7.420 pH Units Final    pCO2, Venous 06/23/2023 46.3  41.0 - 51.0 mm Hg Final    pO2, Venous 06/23/2023 25.1 (L)  27.0 - 53.0 mm Hg Final    84 Value below reference range    HCO3, Venous 06/23/2023 30.0 (H)  22.0 - 28.0 mmol/L Final    Base Excess, Venous 06/23/2023 4.5 (H)  0.0 - 2.0 mmol/L Final    O2 Saturation, Venous 06/23/2023 49.6  45.0 - 75.0 % Final    Hemoglobin, Blood Gas 06/23/2023 15.5  14 - 18 g/dL Final    CO2 Content 06/23/2023 31.4  22 - 33 mmol/L Final    Barometric Pressure for Blood Gas 06/23/2023 726  mmHg Final    Modality 06/23/2023 NRB   Final    FIO2 06/23/2023 100  % Final    Flow Rate 06/23/2023 15.0  lpm Final    Collected by 06/23/2023 [id number]   Final    Meter: [id number]     :  [id number]    Oxyhemoglobin Venous 06/23/2023 47.4  45.0 - 75.0 % Final    Carboxyhemoglobin Venous 06/23/2023 3.9  0.0 - 5.0 % Final    Methemoglobin Venous 06/23/2023 0.6  0.0 - 3.0 % Final    Glucose 06/24/2023 201 (H)  65 - 99 mg/dL Final    BUN 06/24/2023 14  6 - 20 mg/dL Final    Creatinine 06/24/2023 0.71 (L)  0.76 - 1.27 mg/dL Final    Sodium 06/24/2023 137  136 - 145 mmol/L Final    Potassium 06/24/2023 4.2  3.5 - 5.2 mmol/L Final    Chloride 06/24/2023 101  98 - 107 mmol/L Final    CO2 06/24/2023 25.4  22.0 - 29.0 mmol/L Final    Calcium 06/24/2023 9.3  8.6 - 10.5 mg/dL Final    BUN/Creatinine Ratio 06/24/2023 19.7  7.0 - 25.0 Final    Anion Gap 06/24/2023 10.6  5.0 - 15.0 mmol/L Final    eGFR 06/24/2023 126.6  >60.0 mL/min/1.73 Final    Color, UA 06/24/2023 Dark Yellow (A)  Yellow, Straw Final    Appearance, UA 06/24/2023 Clear  Clear Final    pH, UA 06/24/2023 6.5  5.0 - 8.0 Final    Specific Gravity, UA 06/24/2023 1.026  1.005 - 1.030 Final    Glucose, UA 06/24/2023 Negative  Negative Final    Ketones, UA 06/24/2023 40 mg/dL (2+) (A)  Negative Final    Bilirubin, UA 06/24/2023 Negative  Negative Final    Blood, UA 06/24/2023 Negative  Negative Final    Protein,  UA 06/24/2023 Negative  Negative Final    Leuk Esterase, UA 06/24/2023 Negative  Negative Final    Nitrite, UA 06/24/2023 Negative  Negative Final    Urobilinogen, UA 06/24/2023 1.0 E.U./dL  0.2 - 1.0 E.U./dL Final    Fentanyl, Urine 06/24/2023 Negative  Negative Final    Magnesium 06/24/2023 2.0  1.6 - 2.6 mg/dL Final    COVID19 06/24/2023 Not Detected  Not Detected - Ref. Range Final    Influenza A PCR 06/24/2023 Not Detected  Not Detected Final    Influenza B PCR 06/24/2023 Not Detected  Not Detected Final       Assessment & Plan    Diagnoses and all orders for this visit:    1. Severe episode of recurrent major depressive disorder, without psychotic features (Primary)    2. Generalized anxiety disorder    3. Other insomnia    4. Nicotine use disorder    5. Cannabis use disorder    6. Alcohol use disorder, moderate, dependence      -Patient had another suicide attempt since last visit and tends to minimize the situation.  He reports things are better and he feels that he does not want to die now but he tends to go back and forth and reported no inciting incident or issues the day that he attempted again or changes in mood or anxiety symptoms.  We will continue medications as is and continue to monitor closely.  -Reviewed previous available documentation  -Reviewed most recent available labs   -Continue Wellbutrin  mg p.o. daily for mood and anxiety as well as smoking cessation  -Continue Abilify 5 mg p.o. daily for mood and anxiety  -Continue Remeron 7.5 mg p.o. nightly for mood, anxiety, insomnia  -Encourage cessation of nicotine  -Encouraged cessation of cannabis  -Encourage alcohol cessation  -Admitted to the partial hospitalization program due to repeated suicidal ideation with recent attempt and patient requiring a higher level of care to maintain and ensure safety    Visit Diagnoses:    ICD-10-CM ICD-9-CM   1. Severe episode of recurrent major depressive disorder, without psychotic features  F33.2  296.33   2. Generalized anxiety disorder  F41.1 300.02   3. Other insomnia  G47.09 780.52   4. Nicotine use disorder  F17.200 305.1   5. Cannabis use disorder  F12.90 305.20   6. Alcohol use disorder, moderate, dependence  F10.20 303.90       TREATMENT PLAN - SHORT AND LONG-TERM GOALS: Continue supportive psychotherapy efforts and medications as indicated. Treatment and medication options discussed during today's visit. Patient acknowledged and verbally consented to continue with current treatment plan and was educated on the importance of compliance with treatment and follow-up appointments.    MEDICATION ISSUES:    Discussed medication options and treatment plan of prescribed medication as well as the risks, benefits, and side effects including potential falls, possible impaired driving and metabolic adversities among others. Patient is agreeable to call the office with any worsening of symptoms or onset of side effects. Patient is agreeable to call 911 or go to the nearest ER should he/she begin having SI/HI.     MEDS ORDERED DURING VISIT:  No orders of the defined types were placed in this encounter.      FOLLOW UP:  Return in PHP.             This document has been electronically signed by Jimmy Vanegas MD  July 28, 2023 15:58 EDT    Dictated using Dragon Dictation.

## 2023-07-21 ENCOUNTER — OFFICE VISIT (OUTPATIENT)
Dept: PSYCHIATRY | Facility: HOSPITAL | Age: 28
End: 2023-07-21
Payer: MEDICAID

## 2023-07-21 DIAGNOSIS — F33.2 SEVERE EPISODE OF RECURRENT MAJOR DEPRESSIVE DISORDER, WITHOUT PSYCHOTIC FEATURES: Primary | ICD-10-CM

## 2023-07-21 PROCEDURE — H0035 MH PARTIAL HOSP TX UNDER 24H: HCPCS

## 2023-07-21 NOTE — PROGRESS NOTES
"Adult PHP Group      Date: July 21, 2023  Time: [id number]    Type of Group:  Psychoeducation, Psychotherapy     Group  Content: Therapist facilitated group therapy session focused on DBT emotion regulation skills. Therapist assisted group members with learning \"opposite action\" skill. Group members identified overwhelming emotions they experience often, their initial responses, and alternative response, and a potential new emotion. Group members nearly completed activity and will resume it next week.     Patient Response: Patient did well with exercise. He participated appropriately. Group member identified overwhelming emotions he struggles with as pain (emotional), worthlessness, shame, guilt, etc. He identifies his initial responses as becoming irritable, isolating himself, SI, and shutting down emotionally. Group member identified alternative responses as deep breathing exercises, visualization, and doing enjoyable activities. He identified new potential emotions as feeling relaxed, stable, and calmer.     Patient also discussed his recent suicide attempt during group. Other group members and therapist provided emotional support.         Cecy Young  07/21/23  18:13 EDT    "

## 2023-07-24 ENCOUNTER — OFFICE VISIT (OUTPATIENT)
Dept: PSYCHIATRY | Facility: HOSPITAL | Age: 28
End: 2023-07-24
Payer: MEDICAID

## 2023-07-24 DIAGNOSIS — F33.2 SEVERE EPISODE OF RECURRENT MAJOR DEPRESSIVE DISORDER, WITHOUT PSYCHOTIC FEATURES: Primary | ICD-10-CM

## 2023-07-24 PROCEDURE — H0035 MH PARTIAL HOSP TX UNDER 24H: HCPCS

## 2023-07-26 ENCOUNTER — OFFICE VISIT (OUTPATIENT)
Dept: PSYCHIATRY | Facility: HOSPITAL | Age: 28
End: 2023-07-26
Payer: MEDICAID

## 2023-07-26 DIAGNOSIS — F33.2 SEVERE EPISODE OF RECURRENT MAJOR DEPRESSIVE DISORDER, WITHOUT PSYCHOTIC FEATURES: Primary | ICD-10-CM

## 2023-07-26 PROCEDURE — H0035 MH PARTIAL HOSP TX UNDER 24H: HCPCS

## 2023-07-27 ENCOUNTER — OFFICE VISIT (OUTPATIENT)
Dept: PSYCHIATRY | Facility: HOSPITAL | Age: 28
End: 2023-07-27
Payer: MEDICAID

## 2023-07-27 DIAGNOSIS — F33.2 SEVERE EPISODE OF RECURRENT MAJOR DEPRESSIVE DISORDER, WITHOUT PSYCHOTIC FEATURES: Primary | ICD-10-CM

## 2023-07-27 DIAGNOSIS — F12.90 CANNABIS USE DISORDER: ICD-10-CM

## 2023-07-27 DIAGNOSIS — G47.09 OTHER INSOMNIA: ICD-10-CM

## 2023-07-27 DIAGNOSIS — F41.1 GENERALIZED ANXIETY DISORDER: ICD-10-CM

## 2023-07-27 DIAGNOSIS — F10.20 ALCOHOL USE DISORDER, MODERATE, DEPENDENCE: ICD-10-CM

## 2023-07-27 DIAGNOSIS — F17.200 NICOTINE USE DISORDER: ICD-10-CM

## 2023-07-27 PROCEDURE — 1159F MED LIST DOCD IN RCRD: CPT | Performed by: PSYCHIATRY & NEUROLOGY

## 2023-07-27 PROCEDURE — 1160F RVW MEDS BY RX/DR IN RCRD: CPT | Performed by: PSYCHIATRY & NEUROLOGY

## 2023-07-27 RX ORDER — BUPROPION HYDROCHLORIDE 300 MG/1
300 TABLET ORAL EVERY MORNING
Qty: 30 TABLET | Refills: 0 | Status: SHIPPED | OUTPATIENT
Start: 2023-07-27

## 2023-07-27 RX ORDER — ARIPIPRAZOLE 5 MG/1
5 TABLET ORAL DAILY
Qty: 30 TABLET | Refills: 0 | Status: SHIPPED | OUTPATIENT
Start: 2023-07-27

## 2023-07-27 RX ORDER — MIRTAZAPINE 7.5 MG/1
7.5 TABLET, FILM COATED ORAL NIGHTLY
Qty: 30 TABLET | Refills: 0 | Status: SHIPPED | OUTPATIENT
Start: 2023-07-27

## 2023-07-27 NOTE — PROGRESS NOTES
Subjective   Antony Salgado is a 28 y.o. male who presents today for follow up    Chief Complaint:  Depression, Anxiety    History of Present Illness: Patient presented today for follow-up.  Since last visit, he reports that he has been doing a little more.  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  He still has a passive death wish but has no plan or intent to harm himself currently.  He had his kids this past weekend and enjoyed that and is getting them again this weekend.  He is not having side effects to medications but has been nauseous for a few days which he thinks was due to drinking old orange juice before realizing.      The following portions of the patient's history were reviewed and updated as appropriate: allergies, current medications, past family history, past medical history, past social history, past surgical history and problem list.      Past Medical History:  Past Medical History:   Diagnosis Date    Alcohol abuse     Anxiety     Depression     Heart valve regurgitation     Self-injurious behavior     from age [age]    Suicidal thoughts     Suicide attempt     01/2017: states his had tried to kill self multiple times. States he held knife few days ago but didnt do it; 07/10-07/15/23 [facility] for Suicide attempt w/intent, plan means (tried carbon monoxide)       Social History:  Social History     Socioeconomic History    Marital status: Legally      Spouse name: Sana    Number of children: 2    Years of education: 12    Highest education level: High school graduate   Tobacco Use    Smoking status: Every Day     Packs/day: 0.50     Years: 12.00     Pack years: 6.00     Types: Cigarettes    Smokeless tobacco: Never    Tobacco comments:     Started using tobacco products at age  16   Vaping Use    Vaping Use: Never used   Substance and Sexual Activity    Alcohol use: Not Currently     Comment: a 6 pack a day.    Drug use: Yes     Types: Marijuana, Other     Comment: ETOH; last use 07/15/23; made him sick    Sexual activity: Defer     Partners: Female       Family History:  Family History   Problem Relation Age of Onset    Drug abuse Sister     Suicide Attempts Sister     Suicide Attempts Brother        Past Surgical History:  No past surgical history on file.    Problem List:  Patient Active Problem List   Diagnosis    Severe episode of recurrent major depressive disorder, without psychotic features    Alcohol use disorder, severe, dependence    Accentuation of personality traits    Narcissism in adult       Allergy:   No Known Allergies     Current Medications:   Current Outpatient Medications   Medication Sig Dispense Refill    ARIPiprazole (ABILIFY) 5 MG tablet Take 1 tablet by mouth Daily. 30 tablet 0    buPROPion XL (WELLBUTRIN XL) 300 MG 24 hr tablet Take 1 tablet by mouth Every Morning. Indications: Major Depressive Disorder 30 tablet 0    mirtazapine (REMERON) 7.5 MG tablet Take 1 tablet by mouth Every Night. 30 tablet 0     No current facility-administered medications for this visit.       Review of Symptoms:    Review of Systems   Constitutional:  Positive for activity change, appetite change and fatigue.   HENT:  Negative for congestion and voice change.    Eyes:  Negative for photophobia and visual disturbance.   Respiratory:  Negative for shortness of breath and wheezing.    Cardiovascular:  Negative for chest pain and palpitations.   Gastrointestinal:  Negative for nausea and vomiting.   Endocrine: Negative for cold intolerance and heat intolerance.   Genitourinary:  Negative for frequency and urgency.   Musculoskeletal:  Negative for myalgias and neck stiffness.   Skin:  Negative for rash and wound.   Allergic/Immunologic: Negative for environmental allergies and food  allergies.   Neurological:  Negative for tremors and weakness.   Hematological:  Negative for adenopathy. Does not bruise/bleed easily.   Psychiatric/Behavioral:  Positive for depressed mood and stress. Negative for sleep disturbance and suicidal ideas. The patient is nervous/anxious.        Physical Exam:   There were no vitals taken for this visit.    Appearance: CM of stated age, NAD   Gait, Station, Strength: WNL    Mental Status Exam:       Hygiene:   good  Cooperation:  Cooperative  Eye Contact:  Good  Psychomotor Behavior:  Appropriate  Affect:  Full range  Mood: depressed and anxious - improving   Hopelessness: 4  Speech:  Normal  Thought Process:  Goal directed and Linear  Thought Content:  Normal and Mood congruent  Suicidal:  Death wish  Homicidal:  None  Hallucinations:  None  Delusion:  None  Memory:  Intact  Orientation:  Person, Place, Time, and Situation  Reliability:  good  Insight:  Fair  Judgement:  Poor  Impulse Control:  Poor      Lab Results:   Office Visit on 07/03/2023   Component Date Value Ref Range Status    External Amphetamine Screen Urine 07/03/2023 Negative   Final    External Benzodiazepine Screen Uri* 07/03/2023 Negative   Final    External Cocaine Screen Urine 07/03/2023 Negative   Final    External THC Screen Urine 07/03/2023 Positive (A)   Final    External Methadone Screen Urine 07/03/2023 Negative   Final    External Methamphetamine Screen Ur* 07/03/2023 Negative   Final    External Oxycodone Screen Urine 07/03/2023 Negative   Final    External Buprenorphine Screen Urine 07/03/2023 Negative   Final    External MDMA 07/03/2023 Negative   Final    External Opiates Screen Urine 07/03/2023 Negative   Final       Assessment & Plan    Diagnoses and all orders for this visit:    1. Severe episode of recurrent major depressive disorder, without psychotic features (Primary)  -     mirtazapine (REMERON) 7.5 MG tablet; Take 1 tablet by mouth Every Night.  Dispense: 30 tablet; Refill: 0  -      ARIPiprazole (ABILIFY) 5 MG tablet; Take 1 tablet by mouth Daily.  Dispense: 30 tablet; Refill: 0  -     buPROPion XL (WELLBUTRIN XL) 300 MG 24 hr tablet; Take 1 tablet by mouth Every Morning. Indications: Major Depressive Disorder  Dispense: 30 tablet; Refill: 0    2. Generalized anxiety disorder  -     mirtazapine (REMERON) 7.5 MG tablet; Take 1 tablet by mouth Every Night.  Dispense: 30 tablet; Refill: 0  -     ARIPiprazole (ABILIFY) 5 MG tablet; Take 1 tablet by mouth Daily.  Dispense: 30 tablet; Refill: 0    3. Other insomnia  -     mirtazapine (REMERON) 7.5 MG tablet; Take 1 tablet by mouth Every Night.  Dispense: 30 tablet; Refill: 0    4. Nicotine use disorder  -     buPROPion XL (WELLBUTRIN XL) 300 MG 24 hr tablet; Take 1 tablet by mouth Every Morning. Indications: Major Depressive Disorder  Dispense: 30 tablet; Refill: 0    5. Cannabis use disorder    6. Alcohol use disorder, moderate, dependence      -Showing some mild improvements.  He does not have any suicidal ideation but does have a passive death wish.  He is having some lack of motivation and interest.  -Reviewed previous available documentation  -Reviewed most recent available labs   -Increase Wellbutrin  mg to 300 mg p.o. daily for mood and anxiety as well as smoking cessation  -Continue Abilify 5 mg p.o. daily for mood and anxiety  -Continue Remeron 7.5 mg p.o. nightly for mood, anxiety, insomnia  -Encourage cessation of nicotine  -Encouraged cessation of cannabis  -Encourage alcohol cessation  -Admitted to the partial hospitalization program due to repeated suicidal ideation with recent attempt and patient requiring a higher level of care to maintain and ensure safety    Visit Diagnoses:    ICD-10-CM ICD-9-CM   1. Severe episode of recurrent major depressive disorder, without psychotic features  F33.2 296.33   2. Generalized anxiety disorder  F41.1 300.02   3. Other insomnia  G47.09 780.52   4. Nicotine use disorder  F17.200 305.1   5.  Cannabis use disorder  F12.90 305.20   6. Alcohol use disorder, moderate, dependence  F10.20 303.90       TREATMENT PLAN - SHORT AND LONG-TERM GOALS: Continue supportive psychotherapy efforts and medications as indicated. Treatment and medication options discussed during today's visit. Patient acknowledged and verbally consented to continue with current treatment plan and was educated on the importance of compliance with treatment and follow-up appointments.    MEDICATION ISSUES:    Discussed medication options and treatment plan of prescribed medication as well as the risks, benefits, and side effects including potential falls, possible impaired driving and metabolic adversities among others. Patient is agreeable to call the office with any worsening of symptoms or onset of side effects. Patient is agreeable to call 911 or go to the nearest ER should he/she begin having SI/HI.     MEDS ORDERED DURING VISIT:  New Medications Ordered This Visit   Medications    mirtazapine (REMERON) 7.5 MG tablet     Sig: Take 1 tablet by mouth Every Night.     Dispense:  30 tablet     Refill:  0    ARIPiprazole (ABILIFY) 5 MG tablet     Sig: Take 1 tablet by mouth Daily.     Dispense:  30 tablet     Refill:  0    buPROPion XL (WELLBUTRIN XL) 300 MG 24 hr tablet     Sig: Take 1 tablet by mouth Every Morning. Indications: Major Depressive Disorder     Dispense:  30 tablet     Refill:  0         FOLLOW UP:  Return in PHP.             This document has been electronically signed by Jimmy Vanegas MD  July 28, 2023 17:12 EDT    Dictated using Dragon Dictation.

## 2023-07-28 ENCOUNTER — APPOINTMENT (OUTPATIENT)
Dept: PSYCHIATRY | Facility: HOSPITAL | Age: 28
End: 2023-07-28
Payer: MEDICAID

## 2023-07-28 DIAGNOSIS — F33.2 SEVERE EPISODE OF RECURRENT MAJOR DEPRESSIVE DISORDER, WITHOUT PSYCHOTIC FEATURES: Primary | ICD-10-CM

## 2023-07-28 PROCEDURE — H0035 MH PARTIAL HOSP TX UNDER 24H: HCPCS

## 2023-07-31 ENCOUNTER — OFFICE VISIT (OUTPATIENT)
Dept: PSYCHIATRY | Facility: HOSPITAL | Age: 28
End: 2023-07-31
Payer: MEDICAID

## 2023-07-31 DIAGNOSIS — F33.2 SEVERE EPISODE OF RECURRENT MAJOR DEPRESSIVE DISORDER, WITHOUT PSYCHOTIC FEATURES: Primary | ICD-10-CM

## 2023-07-31 PROCEDURE — H0035 MH PARTIAL HOSP TX UNDER 24H: HCPCS

## 2023-08-01 ENCOUNTER — OFFICE VISIT (OUTPATIENT)
Dept: PSYCHIATRY | Facility: HOSPITAL | Age: 28
End: 2023-08-01
Payer: MEDICAID

## 2023-08-01 DIAGNOSIS — F33.2 SEVERE EPISODE OF RECURRENT MAJOR DEPRESSIVE DISORDER, WITHOUT PSYCHOTIC FEATURES: Primary | ICD-10-CM

## 2023-08-01 PROCEDURE — H0035 MH PARTIAL HOSP TX UNDER 24H: HCPCS

## 2023-08-01 NOTE — PROGRESS NOTES
"Adult PHP Group      Date: August 1, 2023  Time: 1884-4803    Type of Group:  Psychoeducation, Psychotherapy     Group  Content: Therapist facilitated group therapy session focused on guilt, shame, and self blame. Therapist educated group members on common thinking errors that victims of trauma or hardships have. Today, therapist educated group members on hindsight bias, exaggerating their role in an event, understanding that \"I could have prevented it\" does not equate to fault, and assuming being accountable gives them the power to alter the outcome of a situation or event.     Patient Response: Patient participated appropriately with group. He verbalized understanding of the thinking errors discussed today. Patient was not able to connect the topic to his life experiences, but states he can see how others could. Patient was attentive to group members and provided encouragement.         Cecy Young  08/01/23  15:16 EDT    "

## 2023-08-02 ENCOUNTER — OFFICE VISIT (OUTPATIENT)
Dept: PSYCHIATRY | Facility: HOSPITAL | Age: 28
End: 2023-08-02
Payer: MEDICAID

## 2023-08-02 DIAGNOSIS — F33.2 SEVERE EPISODE OF RECURRENT MAJOR DEPRESSIVE DISORDER, WITHOUT PSYCHOTIC FEATURES: Primary | ICD-10-CM

## 2023-08-02 PROCEDURE — H0035 MH PARTIAL HOSP TX UNDER 24H: HCPCS

## 2023-08-02 NOTE — PROGRESS NOTES
"Adult PHP Group    Date: August 2, 2023  Time: 6455-5671    Type of Group:  Psychotherapy    Group  Content: ([employer]) Viewed the video, \"Everyday Addictions,\" (2019) by Dr. Nai Philip.     Patient Response: Patient related to the group content by applying it to his smoking habit. He shared good insights.       Seth Byrne LCSW  08/02/23  14:20 EDT     "

## 2023-08-03 ENCOUNTER — OFFICE VISIT (OUTPATIENT)
Dept: PSYCHIATRY | Facility: HOSPITAL | Age: 28
End: 2023-08-03
Payer: MEDICAID

## 2023-08-03 ENCOUNTER — APPOINTMENT (OUTPATIENT)
Dept: PSYCHIATRY | Facility: HOSPITAL | Age: 28
End: 2023-08-03
Payer: MEDICAID

## 2023-08-03 DIAGNOSIS — F17.200 NICOTINE USE DISORDER: ICD-10-CM

## 2023-08-03 DIAGNOSIS — F33.2 SEVERE EPISODE OF RECURRENT MAJOR DEPRESSIVE DISORDER, WITHOUT PSYCHOTIC FEATURES: Primary | ICD-10-CM

## 2023-08-03 DIAGNOSIS — F12.90 CANNABIS USE DISORDER: ICD-10-CM

## 2023-08-03 DIAGNOSIS — F41.1 GENERALIZED ANXIETY DISORDER: ICD-10-CM

## 2023-08-03 DIAGNOSIS — G47.09 OTHER INSOMNIA: ICD-10-CM

## 2023-08-03 DIAGNOSIS — R45.89 SUICIDAL RISK: ICD-10-CM

## 2023-08-03 PROCEDURE — 1160F RVW MEDS BY RX/DR IN RCRD: CPT | Performed by: PSYCHIATRY & NEUROLOGY

## 2023-08-03 PROCEDURE — 1159F MED LIST DOCD IN RCRD: CPT | Performed by: PSYCHIATRY & NEUROLOGY

## 2023-08-04 ENCOUNTER — OFFICE VISIT (OUTPATIENT)
Dept: PSYCHIATRY | Facility: HOSPITAL | Age: 28
End: 2023-08-04
Payer: MEDICAID

## 2023-08-04 DIAGNOSIS — F33.2 SEVERE EPISODE OF RECURRENT MAJOR DEPRESSIVE DISORDER, WITHOUT PSYCHOTIC FEATURES: Primary | ICD-10-CM

## 2023-08-04 PROCEDURE — H0035 MH PARTIAL HOSP TX UNDER 24H: HCPCS

## 2023-08-04 NOTE — PROGRESS NOTES
"Adult PHP Group      Date: August 4, 2023  Time: 7101-2574    Type of Group:  Psychoeducation    Group  Content: Self-Care and I Am Statement    Patient Response: Patient was calm and cooperative during group. Therapist conducted group using a MATTHEW Talk \"What Trauma Taught Me About Happiness.\" Patient completed two worksheets on self-care and I am statements. Patient gave helpful suggestions to all peers. Patient answered questions appropriately.         Sanjana Patterson  08/04/23  12:22 EDT   "

## 2023-08-07 ENCOUNTER — OFFICE VISIT (OUTPATIENT)
Dept: PSYCHIATRY | Facility: HOSPITAL | Age: 28
End: 2023-08-07
Payer: MEDICAID

## 2023-08-07 DIAGNOSIS — F33.2 SEVERE EPISODE OF RECURRENT MAJOR DEPRESSIVE DISORDER, WITHOUT PSYCHOTIC FEATURES: Primary | ICD-10-CM

## 2023-08-07 PROCEDURE — H0035 MH PARTIAL HOSP TX UNDER 24H: HCPCS

## 2023-08-07 NOTE — PROGRESS NOTES
Adult PHP Group    Date: August 7, 2023  Time: 2730-5770    Type of Group:  Psychotherapy    Group  Content: Participants viewed a video exploring the concept of finding motivation to change, despite certain challenges such as ADHD. Utilized the bridge metaphor.    Patient Response: Patient arrived 30 minutes late. Upon arrival, he participated in meaningful ways in the conversation despite his having been late.       Seth Byrne LCSW  08/07/23  17:15 EDT

## 2023-08-08 ENCOUNTER — APPOINTMENT (OUTPATIENT)
Dept: PSYCHIATRY | Facility: HOSPITAL | Age: 28
End: 2023-08-08
Payer: MEDICAID

## 2023-08-09 ENCOUNTER — OFFICE VISIT (OUTPATIENT)
Dept: PSYCHIATRY | Facility: HOSPITAL | Age: 28
End: 2023-08-09
Payer: MEDICAID

## 2023-08-09 DIAGNOSIS — F33.2 SEVERE EPISODE OF RECURRENT MAJOR DEPRESSIVE DISORDER, WITHOUT PSYCHOTIC FEATURES: Primary | ICD-10-CM

## 2023-08-09 PROCEDURE — H0035 MH PARTIAL HOSP TX UNDER 24H: HCPCS

## 2023-08-09 NOTE — PROGRESS NOTES
NAME: Antony Salgado  DATE: 08/01/2023    -- [facility] PHP --     Time:   1746-8535    Services Received This Date:    X 4 Psychotherapy Group   X 0 Education/Training Group   X 0 Individual Psychotherapy   X 0 Activity Therapy Group   X 0 MD Services (Ongoing)   X 0 MD Services (Initial)       DATA:          Psychotherapy Group (Check-in):  Therapist asked that each patient share a summary of how they're doing, including progress towards therapy goals and any new stressors that may have occurred, as well as any items they wish to put on today's agenda. Therapist provided empathy and support.      Psychotherapy Group: This  group focused on further exploring topics mentioned at check ins. Patients are given opportunities to build interpersonal confidence, practice communication skills, and receive empathic understanding from others. Therapist encourages group members to share and respond to one another about their experience with this.      Psychotherapy Group: This  group focused on further exploring topics mentioned at check ins. Patients are given opportunities to build interpersonal confidence, practice communication skills, and receive empathic understanding from others. Therapist encourages group members to share and respond to one another about their experience with this.     Patient Response:     Personal Assessment 0-10 Scale (10 worst)   Depression: 0  Anxiety: 0    Patient arrived in person and participated in therapy today. Patient shared that things are going okay. Reports less anhedonia than usual, rates this at a 5 out of 10 (with ten being the worst.) Reports that he's been enjoying playing video games lately.     ASSESSMENT:    MSE within normal limits.     Impression/Formulation:    ICD-10-CM ICD-9-CM   1. Severe episode of recurrent major depressive disorder, without psychotic features  F33.2 296.33         CLINICAL MANUVERING/INTERVENTIONS:  Therapist utilized a person-centered approach to build  rapport with patient.  Therapist implemented motivational interviewing techniques to assist patient with exploring personal growth and change.   Therapist applied cognitive behavioral strategies to facilitate identification of maladaptive patterns of thinking and behavior.  Therapist employed group interaction activities to build rapport among group members, promote healthy lifestyle, and emphasize improvement of symptoms.  Therapist promoted safe nonjudgmental environment by providing group members with unconditional positive regard and encouraging group members to comply with group rules and guidelines.  Therapist utilized dialectical behavior techniques to teach and model emotional regulation and relaxation.  Therapist assisted group member with identifying and implementing healthier coping strategies.  Group member was encouraged to make positive daily choices, and maintain healthy boundaries. Group format provides experiential learning of the benefit of sharing openly with others.     PLAN:  Continue [facility].  Aftercare:  Step down to IOP level of care     This document signed by Jamal Pina Olympic Memorial HospitalJASMINE, August 9, 2023, 10:56 EDT

## 2023-08-10 ENCOUNTER — OFFICE VISIT (OUTPATIENT)
Dept: PSYCHIATRY | Facility: HOSPITAL | Age: 28
End: 2023-08-10
Payer: MEDICAID

## 2023-08-10 ENCOUNTER — APPOINTMENT (OUTPATIENT)
Dept: PSYCHIATRY | Facility: HOSPITAL | Age: 28
End: 2023-08-10
Payer: MEDICAID

## 2023-08-10 DIAGNOSIS — F17.200 NICOTINE USE DISORDER: ICD-10-CM

## 2023-08-10 DIAGNOSIS — F12.90 CANNABIS USE DISORDER: ICD-10-CM

## 2023-08-10 DIAGNOSIS — G47.09 OTHER INSOMNIA: ICD-10-CM

## 2023-08-10 DIAGNOSIS — R45.89 SUICIDAL RISK: ICD-10-CM

## 2023-08-10 DIAGNOSIS — G44.229 CHRONIC TENSION-TYPE HEADACHE, NOT INTRACTABLE: ICD-10-CM

## 2023-08-10 DIAGNOSIS — F41.1 GENERALIZED ANXIETY DISORDER: ICD-10-CM

## 2023-08-10 DIAGNOSIS — F33.2 SEVERE EPISODE OF RECURRENT MAJOR DEPRESSIVE DISORDER, WITHOUT PSYCHOTIC FEATURES: Primary | ICD-10-CM

## 2023-08-10 PROCEDURE — H0035 MH PARTIAL HOSP TX UNDER 24H: HCPCS

## 2023-08-10 PROCEDURE — 1159F MED LIST DOCD IN RCRD: CPT | Performed by: PSYCHIATRY & NEUROLOGY

## 2023-08-10 PROCEDURE — 1160F RVW MEDS BY RX/DR IN RCRD: CPT | Performed by: PSYCHIATRY & NEUROLOGY

## 2023-08-10 RX ORDER — AMITRIPTYLINE HYDROCHLORIDE 25 MG/1
25 TABLET, FILM COATED ORAL NIGHTLY
Qty: 30 TABLET | Refills: 0 | Status: SHIPPED | OUTPATIENT
Start: 2023-08-10

## 2023-08-10 NOTE — PROGRESS NOTES
Adult PHP Group      Date: August 10, 2023  Time: 0437-3778    Type of Group:  Psychoeducation, Psychotherapy     Group  Content: Therapist facilitated group therapy session focused on Wise Mind. Group members completed a wise mind project that they started during a previous group. Group members identified characteristics of an individual using their wise mind and risks of emotional mind and reasonable mind. Group members practiced accessing their wise mind to make decisions about a problem they are experiencing.     Patient Response: Patient did well with session. Patient participated appropriately and showed a high level of insight. Patient reports he gravitates toward using his reasonable mind when making decisions. Patient was able to identify risks of this, including poor judgement, apathy, interpersonal conflict, lack of sense of purpose, and being detached. Patient verbalized understanding of exercises to access his wise mind and is agreeable to practice over the weekend.         Cecy Young  08/10/23  14:49 EDT

## 2023-08-10 NOTE — PROGRESS NOTES
Subjective   Antony Salgado is a 28 y.o. male who presents today for follow up    Chief Complaint:  Depression, Anxiety    History of Present Illness: Patient presenting today in the partial hospitalization program for mental health. Since last visit he reports no major changes to life or social situation. He feels that group and individual therapy have been helpful but he is still dysphoric and has a hard time enjoying activities or being motivated but does report that he has had some anticipation and enjoyment out of some recent activities, not to the extent that would be normal for the activity or that he would like, but some positive feelings which is an improvement over baseline and may mean that progress is starting to occur. He is not having any medication side effects but is having a mild, dull, headache in the front of his head and feels sleepy a lot. He continues to have intermittent, and by his report, fleeting SI at times but has not had any intent or lingering thoughts recently. He currently denies SI/HI/AVH.       The following portions of the patient's history were reviewed and updated as appropriate: allergies, current medications, past family history, past medical history, past social history, past surgical history and problem list.      Past Medical History:  Past Medical History:   Diagnosis Date    Alcohol abuse     Anxiety     Depression     Heart valve regurgitation     Self-injurious behavior     from age [age]    Suicidal thoughts     Suicide attempt     01/2017: states his had tried to kill self multiple times. States he held knife few days ago but didnt do it; 07/10-07/15/23 [facility] for Suicide attempt w/intent, plan means (tried carbon monoxide)       Social History:  Social History     Socioeconomic History    Marital status: Legally      Spouse name: Sana    Number of children: 2    Years of education: 12    Highest education level: High  school graduate   Tobacco Use    Smoking status: Every Day     Packs/day: 0.50     Years: 12.00     Pack years: 6.00     Types: Cigarettes    Smokeless tobacco: Never    Tobacco comments:     Started using tobacco products at age 16   Vaping Use    Vaping Use: Never used   Substance and Sexual Activity    Alcohol use: Not Currently     Comment: a 6 pack a day.    Drug use: Yes     Types: Marijuana, Other     Comment: ETOH; last use 07/15/23; made him sick    Sexual activity: Defer     Partners: Female       Family History:  Family History   Problem Relation Age of Onset    Drug abuse Sister     Suicide Attempts Sister     Suicide Attempts Brother        Past Surgical History:  No past surgical history on file.    Problem List:  Patient Active Problem List   Diagnosis    Severe episode of recurrent major depressive disorder, without psychotic features    Alcohol use disorder, severe, dependence    Accentuation of personality traits    Narcissism in adult       Allergy:   No Known Allergies     Current Medications:   Current Outpatient Medications   Medication Sig Dispense Refill    ARIPiprazole (ABILIFY) 5 MG tablet Take 1 tablet by mouth Daily. 30 tablet 0    buPROPion XL (WELLBUTRIN XL) 300 MG 24 hr tablet Take 1 tablet by mouth Every Morning. Indications: Major Depressive Disorder 30 tablet 0    mirtazapine (REMERON) 7.5 MG tablet Take 1 tablet by mouth Every Night. 30 tablet 0     No current facility-administered medications for this visit.       Review of Symptoms:    Review of Systems   Constitutional:  Positive for activity change, appetite change and fatigue.   HENT:  Negative for congestion and voice change.    Eyes:  Negative for photophobia and visual disturbance.   Respiratory:  Negative for shortness of breath and wheezing.    Cardiovascular:  Negative for chest pain and palpitations.   Gastrointestinal:  Negative for nausea and vomiting.   Endocrine: Negative for cold intolerance and heat intolerance.  "  Genitourinary:  Negative for frequency and urgency.   Musculoskeletal:  Negative for myalgias and neck stiffness.   Skin:  Negative for rash and wound.   Allergic/Immunologic: Negative for environmental allergies and food allergies.   Neurological:  Negative for tremors and weakness.   Hematological:  Negative for adenopathy. Does not bruise/bleed easily.   Psychiatric/Behavioral:  Positive for stress. Negative for sleep disturbance, suicidal ideas and depressed mood. The patient is not nervous/anxious.        Physical Exam:   There were no vitals taken for this visit.    Appearance: CM of stated age, NAD   Gait, Station, Strength: WNL    Mental Status Exam:       Hygiene:   good  Cooperation:  Cooperative  Eye Contact:  Good  Psychomotor Behavior:  Appropriate  Affect:  Restricted  Mood: euthymic - \"Okay\"   Hopelessness: 4  Speech:  Normal  Thought Process:  Goal directed and Linear  Thought Content:  Normal and Mood congruent  Suicidal:  Death wish, reports decreased frequency of death wish/SI thoughts   Homicidal:  None  Hallucinations:  None  Delusion:  None  Memory:  Intact  Orientation:  Person, Place, Time, and Situation  Reliability:  good  Insight:  Fair  Judgement:  Poor  Impulse Control:  Poor      Lab Results:   No visits with results within 1 Month(s) from this visit.   Latest known visit with results is:   Office Visit on 07/03/2023   Component Date Value Ref Range Status    External Amphetamine Screen Urine 07/03/2023 Negative   Final    External Benzodiazepine Screen Uri* 07/03/2023 Negative   Final    External Cocaine Screen Urine 07/03/2023 Negative   Final    External THC Screen Urine 07/03/2023 Positive (A)   Final    External Methadone Screen Urine 07/03/2023 Negative   Final    External Methamphetamine Screen Ur* 07/03/2023 Negative   Final    External Oxycodone Screen Urine 07/03/2023 Negative   Final    External Buprenorphine Screen Urine 07/03/2023 Negative   Final    External MDMA " 07/03/2023 Negative   Final    External Opiates Screen Urine 07/03/2023 Negative   Final       Assessment & Plan    Diagnoses and all orders for this visit:    1. Severe episode of recurrent major depressive disorder, without psychotic features (Primary)    2. Generalized anxiety disorder    3. Other insomnia  -     amitriptyline (ELAVIL) 25 MG tablet; Take 1 tablet by mouth Every Night.  Dispense: 30 tablet; Refill: 0    4. Nicotine use disorder    5. Cannabis use disorder    6. Suicidal risk    7. Chronic tension-type headache, not intractable  -     amitriptyline (ELAVIL) 25 MG tablet; Take 1 tablet by mouth Every Night.  Dispense: 30 tablet; Refill: 0          -Patient presents similarly last week and continues to have dysphoria and euthymia with lack of interest, enjoyment, and difficulty with motivation and positive feelings but does seem to be gaining some positivity and enjoyment from activities to a small degree which may be improvement.  He is having a headache and insomnia so we will try Elavil to see if it can help with both issues.  -Patient remains at relatively high risk due to his significant suicide attempts and his flat affect with no enjoyment and no interest in activities.  Difficult to engage patient fully but he does not present with a lot of negativity or stressors in his life and has trouble with both having insight and giving insight into his thoughts and feelings and where this may stem from.  -Reviewed previous available documentation  -Reviewed most recent available labs   -Continue Wellbutrin  mg p.o. daily for mood and anxiety as well as smoking cessation  -Continue Abilify 5 mg p.o. daily for mood and anxiety  -Continue Remeron 7.5 mg p.o. nightly for mood, anxiety, insomnia  -Reviewed CPT with patient, not indicative of ADHD  -Start Elavil 25 mg nightly for headache and insomnia  -Encourage cessation of nicotine  -Encouraged cessation of cannabis  -Encourage alcohol  cessation  -Admitted to the partial hospitalization program due to repeated suicidal ideation with recent attempt and patient requiring a higher level of care to maintain and ensure safety    Visit Diagnoses:    ICD-10-CM ICD-9-CM   1. Severe episode of recurrent major depressive disorder, without psychotic features  F33.2 296.33   2. Generalized anxiety disorder  F41.1 300.02   3. Other insomnia  G47.09 780.52   4. Nicotine use disorder  F17.200 305.1   5. Cannabis use disorder  F12.90 305.20   6. Suicidal risk  R45.89 300.9   7. Chronic tension-type headache, not intractable  G44.229 339.12       TREATMENT PLAN - SHORT AND LONG-TERM GOALS: Continue supportive psychotherapy efforts and medications as indicated. Treatment and medication options discussed during today's visit. Patient acknowledged and verbally consented to continue with current treatment plan and was educated on the importance of compliance with treatment and follow-up appointments.    MEDICATION ISSUES:    Discussed medication options and treatment plan of prescribed medication as well as the risks, benefits, and side effects including potential falls, possible impaired driving and metabolic adversities among others. Patient is agreeable to call the office with any worsening of symptoms or onset of side effects. Patient is agreeable to call 911 or go to the nearest ER should he/she begin having SI/HI.     MEDS ORDERED DURING VISIT:  New Medications Ordered This Visit   Medications    amitriptyline (ELAVIL) 25 MG tablet     Sig: Take 1 tablet by mouth Every Night.     Dispense:  30 tablet     Refill:  0         FOLLOW UP:  Return in PHP.             This document has been electronically signed by Jimmy Vanegas MD  August 10, 2023 13:05 EDT    Dictated using Dragon Dictation.

## 2023-08-10 NOTE — PROGRESS NOTES
"Adult PHP Group    Date: August 10, 2023  Time: 2861-8856    Type of Group:  Psychotherapy    Group  Content: Viewed a video on YouTube that explored the question, \"Why do we get bored?\" Emphasized the importance of re-framing beliefs, in particular the way we think of emotions. Suggested emotions were unpleasant at times, but useful in some way.     Patient Response: Patient was polite and cooperative. He seemed to comprehend well the concepts being discussed.       Seth Byrne LCSW  08/10/23  14:28 EDT     "

## 2023-08-11 NOTE — PROGRESS NOTES
NAME: Antony Salgado  DATE: 08/02/2023    -- [facility] PHP --     Time:   5616-3075    Services Received This Date:    X 4 Psychotherapy Group   X 0 Education/Training Group   X 0 Individual Psychotherapy   X 0 Activity Therapy Group   X 0 MD Services (Ongoing)   X 0 MD Services (Initial)     DATA:          Psychotherapy Group (Check-in):  Therapist asked that each patient share a summary of how they're doing, including progress towards therapy goals and any new stressors that may have occurred, as well as any items they wish to put on today's agenda. Therapist provided empathy and support.      Psychotherapy Group: This  group focused on further exploring topics mentioned at check ins. Patients are given opportunities to build interpersonal confidence, practice communication skills, and receive empathic understanding from others. Therapist encourages group members to share and respond to one another about their experience with this.      Psychotherapy Group: This  group focused on further exploring topics mentioned at check ins. Patients are given opportunities to build interpersonal confidence, practice communication skills, and receive empathic understanding from others. Therapist encourages group members to share and respond to one another about their experience with this.     Patient Response:     Personal Assessment 0-10 Scale (10 worst)   Depression: 0  Anxiety: 0    Patient arrived in person and participated in therapy today. Patient rated anhedonia as a 7, higher than recent days. Reports that he did however enjoy remodeling his son's room recently.     ASSESSMENT:    Impression/Formulation:    ICD-10-CM ICD-9-CM   1. Severe episode of recurrent major depressive disorder, without psychotic features  F33.2 296.33         CLINICAL MANUVERING/INTERVENTIONS:  Therapist utilized a person-centered approach to build rapport with patient.  Therapist implemented motivational interviewing techniques to assist patient  with exploring personal growth and change.   Therapist applied cognitive behavioral strategies to facilitate identification of maladaptive patterns of thinking and behavior.  Therapist employed group interaction activities to build rapport among group members, promote healthy lifestyle, and emphasize improvement of symptoms.  Therapist promoted safe nonjudgmental environment by providing group members with unconditional positive regard and encouraging group members to comply with group rules and guidelines.  Therapist utilized dialectical behavior techniques to teach and model emotional regulation and relaxation.  Therapist assisted group member with identifying and implementing healthier coping strategies.  Group member was encouraged to make positive daily choices, and maintain healthy boundaries. Group format provides experiential learning of the benefit of sharing openly with others.     PLAN:  Continue [facility] PHP.  Aftercare:  Step down to IOP level of care     This document signed by Jamal Pina [facility] Hospital[facility]ASMINE, August 11, 2023, 13:24 EDT

## 2023-08-14 ENCOUNTER — APPOINTMENT (OUTPATIENT)
Dept: PSYCHIATRY | Facility: HOSPITAL | Age: 28
End: 2023-08-14
Payer: MEDICAID

## 2023-08-15 ENCOUNTER — APPOINTMENT (OUTPATIENT)
Dept: PSYCHIATRY | Facility: HOSPITAL | Age: 28
End: 2023-08-15
Payer: MEDICAID

## 2023-08-15 NOTE — PROGRESS NOTES
NAME: Antony Salgado  DATE: 08/04/2023    -- [facility] PHP --     Time:   7022-8300    Services Received This Date:    X 2 Psychotherapy Group   X 1 Education/Training Group   X 0 Individual Psychotherapy   X 0 Activity Therapy Group   X 0 MD Services (Ongoing)   X 0 MD Services (Initial)       DATA:          Psychotherapy Group (Check-in):  Therapist asked that each patient share a summary of how they're doing, including progress towards therapy goals and any new stressors that may have occurred, as well as any items they wish to put on today's agenda. Therapist provided empathy and support.    Psychotherapy Group: This  group focused on further exploring topics mentioned at check ins. Patients are given opportunities to build interpersonal confidence, practice communication skills, and receive empathic understanding from others. Therapist encourages group members to share and respond to one another about their experience with this.     Patient Response:     Personal Assessment 0-10 Scale (10 worst)   Depression: 0  Anxiety: 0    Patient arrived in person and participated in therapy today. Patient rates anhedonia as a 4 out of 10. Reports this weekend he will do some work and watch the kids.     ASSESSMENT:    Impression/Formulation:    ICD-10-CM ICD-9-CM   1. Severe episode of recurrent major depressive disorder, without psychotic features  F33.2 296.33         CLINICAL MANUVERING/INTERVENTIONS:  Therapist utilized a person-centered approach to build rapport with patient.  Therapist implemented motivational interviewing techniques to assist patient with exploring personal growth and change.   Therapist applied cognitive behavioral strategies to facilitate identification of maladaptive patterns of thinking and behavior.  Therapist employed group interaction activities to build rapport among group members, promote healthy lifestyle, and emphasize improvement of symptoms.  Therapist promoted safe nonjudgmental  environment by providing group members with unconditional positive regard and encouraging group members to comply with group rules and guidelines.  Therapist utilized dialectical behavior techniques to teach and model emotional regulation and relaxation.  Therapist assisted group member with identifying and implementing healthier coping strategies.  Group member was encouraged to make positive daily choices, and maintain healthy boundaries. Group format provides experiential learning of the benefit of sharing openly with others.     PLAN:  Continue [facility].  Aftercare:  Step down to IOP level of care     This document signed by Jamal Pina [facility] CenterJASMINE, August 15, 2023, 09:59 EDT

## 2023-08-16 ENCOUNTER — APPOINTMENT (OUTPATIENT)
Dept: PSYCHIATRY | Facility: HOSPITAL | Age: 28
End: 2023-08-16
Payer: MEDICAID

## 2023-08-16 NOTE — PROGRESS NOTES
NAME: Antony Salgado  DATE: 08/07/2023    -- [facility] PHP --     Time:   4379-3404    Services Received This Date:    X 3 Psychotherapy Group   X 1 Education/Training Group   X 30 min Individual Psychotherapy   X 0 Activity Therapy Group   X 0 MD Services (Ongoing)   X 0 MD Services (Initial)       DATA:          Psychotherapy Group (Check-in):  Therapist asked that each patient share a summary of how they're doing, including progress towards therapy goals and any new stressors that may have occurred, as well as any items they wish to put on today's agenda. Therapist provided empathy and support.     Education/Training Group: Group members received education about cognitive distortions. Therapist encouraged group members to share their thoughts and discuss this topic with one another. Therapist continued facilitation of group cohesiveness.     Psychotherapy Group: This  group focused on further exploring topics mentioned at check ins. Patients are given opportunities to build interpersonal confidence, practice communication skills, and receive empathic understanding from others. Therapist encourages group members to share and respond to one another about their experience with this.     Patient Response:     Personal Assessment 0-10 Scale (10 worst)   Depression: 2  Anxiety: 1    Patient arrived in person and participated in therapy today. Patient rated anhedonia as a 4 out of 10. Reports that going to work was fun this weekend and it was good to see his kids this weekend. Patient spoke minimally during group, only when directly addressed. Patient reports passive suicidal thoughts and denies intent and plan.     Individual Session:  Patient mentions that his feelings of meaninglessness became worse after he stopped believing in God, several years ago. Therapist inquired about patient's interest in searching for life's meaning now; patient stated that if life does have meaning he has no interest in finding out about it.  "Patient states later that he is torn about working to eliminate his suicidal thoughts because they are one of the only things that bring him comfort. Patient again denies suicidal intent and plan, stating \"I'm out of plans.\"  Patient identifies that his protective factor is wanting to stay around for his children. Patient states that he often has a headache, states that it gets worse when he gets exhausted, which is frequently. States that he has had the headaches for years and never seen a doctor about it. States that he is also sleepy \"all the time\" ever since he had his second child. Therapist urges patient to talk to the doctor about his headaches.     ASSESSMENT:  Patient is polite. Speaks only when asked questions, does not tend elaborate on his answers to the questions.       Impression/Formulation:    ICD-10-CM ICD-9-CM   1. Severe episode of recurrent major depressive disorder, without psychotic features  F33.2 296.33         CLINICAL MANUVERING/INTERVENTIONS:  Therapist utilized a person-centered approach to build rapport with patient.  Therapist implemented motivational interviewing techniques to assist patient with exploring personal growth and change.   Therapist applied cognitive behavioral strategies to facilitate identification of maladaptive patterns of thinking and behavior.  Therapist employed group interaction activities to build rapport among group members, promote healthy lifestyle, and emphasize improvement of symptoms.  Therapist promoted safe nonjudgmental environment by providing group members with unconditional positive regard and encouraging group members to comply with group rules and guidelines.  Therapist utilized dialectical behavior techniques to teach and model emotional regulation and relaxation.  Therapist assisted group member with identifying and implementing healthier coping strategies.  Group member was encouraged to make positive daily choices, and maintain healthy " boundaries. Group format provides experiential learning of the benefit of sharing openly with others.     PLAN:  Continue [facility].  Aftercare:  Step down to IOP level of care     This document signed by MICHELLE Lake, August 16, 2023, 11:00 EDT

## 2023-08-16 NOTE — PROGRESS NOTES
NAME: Antony Salgado  DATE: 08/09/2023    -- [facility] PHP --     Time:   0165-4393    Services Received This Date:    X 3 Psychotherapy Group   X 0 Education/Training Group   X 0 Individual Psychotherapy   X 1 Activity Therapy Group   X 0 MD Services (Ongoing)   X 0 MD Services (Initial)       DATA:          Psychotherapy Group (Check-in):  Therapist asked that each patient share a summary of how they're doing, including progress towards therapy goals and any new stressors that may have occurred, as well as any items they wish to put on today's agenda. Therapist provided empathy and support.     Psychotherapy Group: This  group focused on further exploring topics mentioned at check ins. Patients are given opportunities to build interpersonal confidence, practice communication skills, and receive empathic understanding from others. Therapist encourages group members to share and respond to one another about their experience with this.     Activity Therapy Group: Group members received activity therapy from RT Yamilet.     Patient Response:     Personal Assessment 0-10 Scale (10 worst)   Depression: 0  Anxiety: 0    Patient arrived in person and participated in therapy today. Patient rated anhedonia as a 9 out of 10. Patient states that he has been sleeping a lot lately and that things have been feeling more boring and monotonous. Therapist explores this with patient and assists patient in identifying that sleeping a lot is a trigger for patient's feelings of anhedonia and boredom.     ASSESSMENT:    Impression/Formulation:    ICD-10-CM ICD-9-CM   1. Severe episode of recurrent major depressive disorder, without psychotic features  F33.2 296.33         CLINICAL MANUVERING/INTERVENTIONS:  Therapist utilized a person-centered approach to build rapport with patient.  Therapist implemented motivational interviewing techniques to assist patient with exploring personal growth and change.   Therapist applied cognitive  behavioral strategies to facilitate identification of maladaptive patterns of thinking and behavior.  Therapist employed group interaction activities to build rapport among group members, promote healthy lifestyle, and emphasize improvement of symptoms.  Therapist promoted safe nonjudgmental environment by providing group members with unconditional positive regard and encouraging group members to comply with group rules and guidelines.  Therapist utilized dialectical behavior techniques to teach and model emotional regulation and relaxation.  Therapist assisted group member with identifying and implementing healthier coping strategies.  Group member was encouraged to make positive daily choices, and maintain healthy boundaries. Group format provides experiential learning of the benefit of sharing openly with others.     PLAN:  Continue [facility].  Aftercare:  Step down to IOP level of care     This document signed by MICHELLE Lake, August 16, 2023, 12:07 EDT

## 2023-08-16 NOTE — PROGRESS NOTES
NAME: Antony Salgado  DATE: 08/10/2023    -- [facility] --     Time:   6131-3730    Services Received This Date:    X 3 Psychotherapy Group   X 1 Education/Training Group   X 0 Individual Psychotherapy   X 0 Activity Therapy Group   X 1 MD Services (Ongoing)   X 0 MD Services (Initial)       DATA:          Psychotherapy Group (Check-in):  Therapist asked that each patient share a summary of how they're doing, including progress towards therapy goals and any new stressors that may have occurred, as well as any items they wish to put on today's agenda. Therapist provided empathy and support.     Psychotherapy Group: This  group focused on further exploring topics mentioned at check ins. Patients are given opportunities to build interpersonal confidence, practice communication skills, and receive empathic understanding from others. Therapist encourages group members to share and respond to one another about their experience with this.     Psychotherapy Group: This group focused on graduation from the program. Graduating group member gives presentation about what they gained from group, what their plans are for the future, and offers advice about how to get the most out of group. Therapist encourages group members to share and respond to one another about their experience with this, as well as offer a word of encouragement to the graduating member. Therapist guides conversation to instil sense of hope and universality and to teach communication skills.    Patient Response:     Personal Assessment 0-10 Scale (10 worst)   Depression: 1  Anxiety: 0    Patient arrived in person and participated in therapy today. Patient rated anhedonia as a 5 out of 10. Patient rated fatigue as an 8 out of 10. Reports having a headache today but that it's not unbearable. Patient provides thoughtful response to graduating group member, offers words of encouragement. Reports talking to the doctor about his headaches and that the doctor is  going to treat him for this.     ASSESSMENT:    Impression/Formulation:    ICD-10-CM ICD-9-CM   1. Severe episode of recurrent major depressive disorder, without psychotic features  F33.2 296.33   2. Generalized anxiety disorder  F41.1 300.02   3. Other insomnia  G47.09 780.52   4. Nicotine use disorder  F17.200 305.1   5. Cannabis use disorder  F12.90 305.20   6. Suicidal risk  R45.89 300.9   7. Chronic tension-type headache, not intractable  G44.229 339.12         CLINICAL MANUVERING/INTERVENTIONS:  Therapist utilized a person-centered approach to build rapport with patient.  Therapist implemented motivational interviewing techniques to assist patient with exploring personal growth and change.   Therapist applied cognitive behavioral strategies to facilitate identification of maladaptive patterns of thinking and behavior.  Therapist employed group interaction activities to build rapport among group members, promote healthy lifestyle, and emphasize improvement of symptoms.  Therapist promoted safe nonjudgmental environment by providing group members with unconditional positive regard and encouraging group members to comply with group rules and guidelines.  Therapist utilized dialectical behavior techniques to teach and model emotional regulation and relaxation.  Therapist assisted group member with identifying and implementing healthier coping strategies.  Group member was encouraged to make positive daily choices, and maintain healthy boundaries. Group format provides experiential learning of the benefit of sharing openly with others.     PLAN:  Continue [facility].  Aftercare:  Step down to IOP level of care     This document signed by Jamal Pina [facility], August 16, 2023, 12:12 EDT